# Patient Record
Sex: MALE | Race: OTHER | Employment: FULL TIME | URBAN - METROPOLITAN AREA
[De-identification: names, ages, dates, MRNs, and addresses within clinical notes are randomized per-mention and may not be internally consistent; named-entity substitution may affect disease eponyms.]

---

## 2019-03-11 ENCOUNTER — APPOINTMENT (OUTPATIENT)
Dept: CT IMAGING | Facility: HOSPITAL | Age: 49
End: 2019-03-11
Attending: EMERGENCY MEDICINE
Payer: COMMERCIAL

## 2019-03-11 ENCOUNTER — HOSPITAL ENCOUNTER (EMERGENCY)
Facility: HOSPITAL | Age: 49
Discharge: HOME | End: 2019-03-11
Attending: EMERGENCY MEDICINE
Payer: COMMERCIAL

## 2019-03-11 VITALS
RESPIRATION RATE: 18 BRPM | DIASTOLIC BLOOD PRESSURE: 91 MMHG | BODY MASS INDEX: 26.84 KG/M2 | OXYGEN SATURATION: 95 % | HEIGHT: 67 IN | WEIGHT: 171 LBS | HEART RATE: 84 BPM | TEMPERATURE: 99 F | SYSTOLIC BLOOD PRESSURE: 126 MMHG

## 2019-03-11 DIAGNOSIS — N40.0 ENLARGED PROSTATE: ICD-10-CM

## 2019-03-11 DIAGNOSIS — R11.2 NAUSEA AND VOMITING IN ADULT: ICD-10-CM

## 2019-03-11 DIAGNOSIS — R10.9 ABDOMINAL PAIN, ACUTE: Primary | ICD-10-CM

## 2019-03-11 LAB
ANION GAP SERPL CALC-SCNC: 8 MMOL/L (ref 0–18)
BASOPHILS # BLD AUTO: 0.02 X10(3) UL (ref 0–0.2)
BASOPHILS NFR BLD AUTO: 0.3 %
BILIRUB UR QL: NEGATIVE
BUN BLD-MCNC: 13 MG/DL (ref 7–18)
BUN/CREAT SERPL: 13.1 (ref 10–20)
CALCIUM BLD-MCNC: 8.5 MG/DL (ref 8.5–10.1)
CHLORIDE SERPL-SCNC: 106 MMOL/L (ref 98–107)
CLARITY UR: CLEAR
CO2 SERPL-SCNC: 24 MMOL/L (ref 21–32)
COLOR UR: YELLOW
CREAT BLD-MCNC: 0.99 MG/DL (ref 0.7–1.3)
DEPRECATED RDW RBC AUTO: 41.1 FL (ref 35.1–46.3)
EOSINOPHIL # BLD AUTO: 0.03 X10(3) UL (ref 0–0.7)
EOSINOPHIL NFR BLD AUTO: 0.5 %
ERYTHROCYTE [DISTWIDTH] IN BLOOD BY AUTOMATED COUNT: 13.5 % (ref 11–15)
GLUCOSE BLD-MCNC: 109 MG/DL (ref 70–99)
GLUCOSE UR-MCNC: NEGATIVE MG/DL
HAV IGM SER QL: 2.5 MG/DL (ref 1.6–2.6)
HCT VFR BLD AUTO: 53.3 % (ref 39–53)
HGB BLD-MCNC: 18.7 G/DL (ref 13–17.5)
IMM GRANULOCYTES # BLD AUTO: 0.03 X10(3) UL (ref 0–1)
IMM GRANULOCYTES NFR BLD: 0.5 %
KETONES UR-MCNC: NEGATIVE MG/DL
LEUKOCYTE ESTERASE UR QL STRIP.AUTO: NEGATIVE
LYMPHOCYTES # BLD AUTO: 0.68 X10(3) UL (ref 1–4)
LYMPHOCYTES NFR BLD AUTO: 11.7 %
MCH RBC QN AUTO: 29.6 PG (ref 26–34)
MCHC RBC AUTO-ENTMCNC: 35.1 G/DL (ref 31–37)
MCV RBC AUTO: 84.3 FL (ref 80–100)
MONOCYTES # BLD AUTO: 0.49 X10(3) UL (ref 0.1–1)
MONOCYTES NFR BLD AUTO: 8.4 %
NEUTROPHILS # BLD AUTO: 4.57 X10 (3) UL (ref 1.5–7.7)
NEUTROPHILS # BLD AUTO: 4.57 X10(3) UL (ref 1.5–7.7)
NEUTROPHILS NFR BLD AUTO: 78.6 %
NITRITE UR QL STRIP.AUTO: NEGATIVE
OSMOLALITY SERPL CALC.SUM OF ELEC: 287 MOSM/KG (ref 275–295)
PH UR: 5 [PH] (ref 5–8)
PLATELET # BLD AUTO: 141 10(3)UL (ref 150–450)
POTASSIUM SERPL-SCNC: 3.7 MMOL/L (ref 3.5–5.1)
PROT UR-MCNC: NEGATIVE MG/DL
RBC # BLD AUTO: 6.32 X10(6)UL (ref 4.3–5.7)
RBC #/AREA URNS AUTO: 3 /HPF
SODIUM SERPL-SCNC: 138 MMOL/L (ref 136–145)
SP GR UR STRIP: 1.02 (ref 1–1.03)
UROBILINOGEN UR STRIP-ACNC: <2
VIT C UR-MCNC: NEGATIVE MG/DL
WBC # BLD AUTO: 5.8 X10(3) UL (ref 4–11)
WBC #/AREA URNS AUTO: <1 /HPF

## 2019-03-11 PROCEDURE — 81001 URINALYSIS AUTO W/SCOPE: CPT | Performed by: EMERGENCY MEDICINE

## 2019-03-11 PROCEDURE — 83735 ASSAY OF MAGNESIUM: CPT | Performed by: EMERGENCY MEDICINE

## 2019-03-11 PROCEDURE — 99285 EMERGENCY DEPT VISIT HI MDM: CPT

## 2019-03-11 PROCEDURE — 85060 BLOOD SMEAR INTERPRETATION: CPT | Performed by: EMERGENCY MEDICINE

## 2019-03-11 PROCEDURE — 96374 THER/PROPH/DIAG INJ IV PUSH: CPT

## 2019-03-11 PROCEDURE — 96361 HYDRATE IV INFUSION ADD-ON: CPT

## 2019-03-11 PROCEDURE — 80048 BASIC METABOLIC PNL TOTAL CA: CPT

## 2019-03-11 PROCEDURE — 85060 BLOOD SMEAR INTERPRETATION: CPT

## 2019-03-11 PROCEDURE — 80048 BASIC METABOLIC PNL TOTAL CA: CPT | Performed by: EMERGENCY MEDICINE

## 2019-03-11 PROCEDURE — 74177 CT ABD & PELVIS W/CONTRAST: CPT | Performed by: EMERGENCY MEDICINE

## 2019-03-11 PROCEDURE — 85025 COMPLETE CBC W/AUTO DIFF WBC: CPT

## 2019-03-11 PROCEDURE — 85025 COMPLETE CBC W/AUTO DIFF WBC: CPT | Performed by: EMERGENCY MEDICINE

## 2019-03-11 RX ORDER — ONDANSETRON 4 MG/1
4 TABLET, ORALLY DISINTEGRATING ORAL EVERY 4 HOURS PRN
Qty: 10 TABLET | Refills: 0 | Status: SHIPPED | OUTPATIENT
Start: 2019-03-11 | End: 2019-03-18

## 2019-03-11 RX ORDER — MORPHINE SULFATE 4 MG/ML
4 INJECTION, SOLUTION INTRAMUSCULAR; INTRAVENOUS ONCE
Status: COMPLETED | OUTPATIENT
Start: 2019-03-11 | End: 2019-03-11

## 2019-03-11 RX ORDER — DICYCLOMINE HCL 20 MG
20 TABLET ORAL 4 TIMES DAILY PRN
Qty: 30 TABLET | Refills: 0 | Status: SHIPPED | OUTPATIENT
Start: 2019-03-11 | End: 2019-04-10

## 2019-03-11 NOTE — ED PROVIDER NOTES
Patient Seen in: [facility] AND [facility] Emergency Department    History   Patient presents with:  Abdomen/Flank Pain (GI/)    Stated Complaint: rule out appendicitis    HPI    43-year-old male presents for complaint of right lower quadrant abdominal pain. 26.78 kg/m²         Physical Exam   Constitutional: He is oriented to person, place, and time. He appears well-developed and well-nourished. HENT:   Head: Normocephalic and atraumatic. Neck: Normal range of motion. Neck supple.    Cardiovascular: Normal unremarkable urinalysis negative for any acute findings. CT scan negative for acute findings. He is informed of his prostate enlargement. Patient likely with viral syndrome. Hes feeling better at discharge.      Imaging:   Ct Abdomen+pelvis(contrast Only visible pulmonary or pleural disease. OTHER: Negative. CONCLUSION:     1. Uncomplicated sigmoid diverticulosis. 2. Fatty liver. 3. 1.10 cm splenule. 4. Small umbilical hernia. 5. Mild reflex ileus. 6. Evidence of herniorrhaphy lower abdomen.  7. Normal ap

## 2019-09-23 NOTE — H&P
[address] [facility] [address] Gastroenterology                                                                                                  Clinic History and Physical     Pa Medications, Allergies, ROS:      Past Medical History:   Diagnosis Date   • Diverticulitis       No past surgical history on file. Family Hx: No family history on file.    Social History: Social History    Tobacco Use      Smoking status: Never Smoker edema is evident.    Neuro: Alert and oriented x4, and patient is having movements of all 4 extremities   Psych: Pt has a normal mood and affect, behavior is normal    Nursing note and vitals reviewed      Labs/Imaging:     Patient's labs and imaging were r procedure(s) - N/A   - NO alcohol, recreational drugs nor erectile dysfunction mediations 24 hours before procedure(s)   - NO herbal supplements or weight loss medications x 7 days prior to the procedure(s)    ** If MAC @ [facility] or IV twilight - continue all

## 2019-09-30 ENCOUNTER — TELEPHONE (OUTPATIENT)
Dept: GASTROENTEROLOGY | Facility: CLINIC | Age: 49
End: 2019-09-30

## 2019-09-30 ENCOUNTER — OFFICE VISIT (OUTPATIENT)
Dept: GASTROENTEROLOGY | Facility: CLINIC | Age: 49
End: 2019-09-30
Payer: COMMERCIAL

## 2019-09-30 VITALS
BODY MASS INDEX: 29.53 KG/M2 | WEIGHT: 173 LBS | HEART RATE: 69 BPM | SYSTOLIC BLOOD PRESSURE: 136 MMHG | HEIGHT: 64 IN | DIASTOLIC BLOOD PRESSURE: 96 MMHG

## 2019-09-30 DIAGNOSIS — Z87.19 HISTORY OF COLONIC DIVERTICULITIS: Primary | ICD-10-CM

## 2019-09-30 DIAGNOSIS — Z12.11 COLON CANCER SCREENING: Primary | ICD-10-CM

## 2019-09-30 DIAGNOSIS — Z87.19 HISTORY OF COLONIC DIVERTICULITIS: ICD-10-CM

## 2019-09-30 DIAGNOSIS — R10.32 LLQ PAIN: ICD-10-CM

## 2019-09-30 PROCEDURE — 99243 OFF/OP CNSLTJ NEW/EST LOW 30: CPT | Performed by: NURSE PRACTITIONER

## 2019-09-30 RX ORDER — NAPROXEN 500 MG/1
TABLET ORAL
Refills: 0 | COMMUNITY
Start: 2019-07-22 | End: 2020-11-12

## 2019-09-30 NOTE — PATIENT INSTRUCTIONS
Recommend:  -Schedule colonoscopy w/Dr. Rosa Nunez, Dr. Alireza Arias, Dr. Ramon Vázquez w/ IV Twilight or MAC  Dx: hx diverticulitis   -Eligible for NE: Yes  -Prep: Split dose Colyte or equivalent  -Anti-platelets and anti-coagulants: None  -Diabetes meds: None    ** If

## 2019-09-30 NOTE — TELEPHONE ENCOUNTER
Scheduled for:  Colonoscopy 30686  Provider Name:   Date:  10/7/19  Location:  [facility]  Sedation:  Mac  Time:  0900 ----------- Dagmar Tian 0800  Prep: Colyte  Meds/Allergies Reconciled?:  Physician reviewed  Diagnosis with codes:  Colon cancer screening Z12. 1

## 2019-10-07 ENCOUNTER — ANESTHESIA EVENT (OUTPATIENT)
Dept: ENDOSCOPY | Facility: HOSPITAL | Age: 49
End: 2019-10-07
Payer: COMMERCIAL

## 2019-10-07 ENCOUNTER — ANESTHESIA (OUTPATIENT)
Dept: ENDOSCOPY | Facility: HOSPITAL | Age: 49
End: 2019-10-07
Payer: COMMERCIAL

## 2019-10-07 ENCOUNTER — HOSPITAL ENCOUNTER (OUTPATIENT)
Facility: HOSPITAL | Age: 49
Setting detail: HOSPITAL OUTPATIENT SURGERY
Discharge: HOME | End: 2019-10-07
Attending: INTERNAL MEDICINE | Admitting: INTERNAL MEDICINE
Payer: COMMERCIAL

## 2019-10-07 VITALS
DIASTOLIC BLOOD PRESSURE: 90 MMHG | HEIGHT: 64 IN | OXYGEN SATURATION: 95 % | SYSTOLIC BLOOD PRESSURE: 128 MMHG | HEART RATE: 73 BPM | RESPIRATION RATE: 15 BRPM | WEIGHT: 170 LBS | BODY MASS INDEX: 29.02 KG/M2

## 2019-10-07 DIAGNOSIS — Z87.19 HISTORY OF COLONIC DIVERTICULITIS: ICD-10-CM

## 2019-10-07 DIAGNOSIS — Z12.11 COLON CANCER SCREENING: ICD-10-CM

## 2019-10-07 PROCEDURE — 0DJD8ZZ INSPECTION OF LOWER INTESTINAL TRACT, VIA NATURAL OR ARTIFICIAL OPENING ENDOSCOPIC: ICD-10-PCS | Performed by: INTERNAL MEDICINE

## 2019-10-07 PROCEDURE — 45378 DIAGNOSTIC COLONOSCOPY: CPT | Performed by: INTERNAL MEDICINE

## 2019-10-07 RX ORDER — NALOXONE HYDROCHLORIDE 0.4 MG/ML
80 INJECTION, SOLUTION INTRAMUSCULAR; INTRAVENOUS; SUBCUTANEOUS AS NEEDED
Status: DISCONTINUED | OUTPATIENT
Start: 2019-10-07 | End: 2019-10-07

## 2019-10-07 RX ORDER — SODIUM CHLORIDE, SODIUM LACTATE, POTASSIUM CHLORIDE, CALCIUM CHLORIDE 600; 310; 30; 20 MG/100ML; MG/100ML; MG/100ML; MG/100ML
INJECTION, SOLUTION INTRAVENOUS CONTINUOUS
Status: DISCONTINUED | OUTPATIENT
Start: 2019-10-07 | End: 2019-10-07

## 2019-10-07 RX ORDER — LIDOCAINE HYDROCHLORIDE 10 MG/ML
INJECTION, SOLUTION EPIDURAL; INFILTRATION; INTRACAUDAL; PERINEURAL AS NEEDED
Status: DISCONTINUED | OUTPATIENT
Start: 2019-10-07 | End: 2019-10-07 | Stop reason: SURG

## 2019-10-07 RX ADMIN — SODIUM CHLORIDE, SODIUM LACTATE, POTASSIUM CHLORIDE, CALCIUM CHLORIDE: 600; 310; 30; 20 INJECTION, SOLUTION INTRAVENOUS at 09:52:00

## 2019-10-07 RX ADMIN — LIDOCAINE HYDROCHLORIDE 50 MG: 10 INJECTION, SOLUTION EPIDURAL; INFILTRATION; INTRACAUDAL; PERINEURAL at 09:52:00

## 2019-10-07 RX ADMIN — SODIUM CHLORIDE, SODIUM LACTATE, POTASSIUM CHLORIDE, CALCIUM CHLORIDE: 600; 310; 30; 20 INJECTION, SOLUTION INTRAVENOUS at 10:27:00

## 2019-10-07 NOTE — ANESTHESIA PREPROCEDURE EVALUATION
Anesthesia PreOp Note    HPI:     Sherrell Basilio is a 54year old male who presents for preoperative consultation requested by: Mary Jo Liu MD    Date of Surgery: 10/7/2019    Procedure(s):  COLONOSCOPY  Indication: Colon cancer screening, Histo file        Minutes per session: Not on file      Stress: Not on file    Relationships      Social connections:        Talks on phone: Not on file        Gets together: Not on file        Attends [employer] service: Not on file        Active member of club The patient desires the anesthetic management as planned.   Andrew Mast  10/7/2019 9:47 AM

## 2019-10-07 NOTE — H&P
History & Physical Examination    Patient Name: Austin Mckeon  MRN: [id number]  [facility]: [id number]  YOB: 1970    Diagnosis: Colorectal cancer screening, personal history of diverticulitis        Medications Prior to Admission:  naproxen 500 MG

## 2019-10-07 NOTE — ANESTHESIA POSTPROCEDURE EVALUATION
Patient: Rex Early    Procedure Summary     Date:  10/07/19 Room / Location:  [facility] ENDOSCOPY 04 / [facility] ENDOSCOPY    Anesthesia Start:  6134 Anesthesia Stop:      Procedure:  COLONOSCOPY (N/A ) Diagnosis:       Colon cancer screening      History of col

## 2019-10-07 NOTE — OPERATIVE REPORT
[facility] Endoscopy Report      Date of Procedure:  10/07/19      Preoperative Diagnosis:  1. Colorectal cancer screening  2.   Personal history of diverticulitis      Postoperative Diagnosis:  Sigmoid colon diverticulosis      Procedure: colonoscopy in 10 years unless any new symptoms or signs are noted. 3.  Surgical evaluation if the patient continues to have recurrent episodes of diverticulitis or has a complicated episode in the future.         Matilde Mcneil MD  10/7/2019

## 2019-10-20 ENCOUNTER — TELEPHONE (OUTPATIENT)
Dept: GASTROENTEROLOGY | Facility: CLINIC | Age: 49
End: 2019-10-20

## 2019-10-21 NOTE — TELEPHONE ENCOUNTER
Recall colon in 10 years per Dr. Gudelia Barrett & updated in health maintenance. Colon done 10/7/19.

## 2019-11-18 ENCOUNTER — APPOINTMENT (OUTPATIENT)
Dept: GENERAL RADIOLOGY | Facility: HOSPITAL | Age: 49
End: 2019-11-18
Attending: EMERGENCY MEDICINE
Payer: COMMERCIAL

## 2019-11-18 ENCOUNTER — HOSPITAL ENCOUNTER (EMERGENCY)
Facility: HOSPITAL | Age: 49
Discharge: HOME HEALTH | End: 2019-11-18
Attending: EMERGENCY MEDICINE
Payer: COMMERCIAL

## 2019-11-18 VITALS
WEIGHT: 170 LBS | OXYGEN SATURATION: 93 % | RESPIRATION RATE: 14 BRPM | TEMPERATURE: 99 F | DIASTOLIC BLOOD PRESSURE: 86 MMHG | BODY MASS INDEX: 29.02 KG/M2 | HEART RATE: 88 BPM | SYSTOLIC BLOOD PRESSURE: 137 MMHG | HEIGHT: 64 IN

## 2019-11-18 DIAGNOSIS — M62.830 LUMBAR PARASPINAL MUSCLE SPASM: Primary | ICD-10-CM

## 2019-11-18 PROCEDURE — 99284 EMERGENCY DEPT VISIT MOD MDM: CPT

## 2019-11-18 PROCEDURE — 96374 THER/PROPH/DIAG INJ IV PUSH: CPT

## 2019-11-18 PROCEDURE — 72100 X-RAY EXAM L-S SPINE 2/3 VWS: CPT | Performed by: EMERGENCY MEDICINE

## 2019-11-18 PROCEDURE — 96375 TX/PRO/DX INJ NEW DRUG ADDON: CPT

## 2019-11-18 PROCEDURE — 96376 TX/PRO/DX INJ SAME DRUG ADON: CPT

## 2019-11-18 RX ORDER — HYDROCODONE BITARTRATE AND ACETAMINOPHEN 5; 325 MG/1; MG/1
1 TABLET ORAL EVERY 6 HOURS PRN
COMMUNITY
End: 2020-11-12

## 2019-11-18 RX ORDER — LORAZEPAM 2 MG/ML
0.5 INJECTION INTRAMUSCULAR ONCE
Status: COMPLETED | OUTPATIENT
Start: 2019-11-18 | End: 2019-11-18

## 2019-11-18 RX ORDER — ONDANSETRON 2 MG/ML
4 INJECTION INTRAMUSCULAR; INTRAVENOUS ONCE
Status: COMPLETED | OUTPATIENT
Start: 2019-11-18 | End: 2019-11-18

## 2019-11-18 RX ORDER — CYCLOBENZAPRINE HCL 10 MG
10 TABLET ORAL 3 TIMES DAILY PRN
COMMUNITY
End: 2020-11-12

## 2019-11-18 RX ORDER — MORPHINE SULFATE 4 MG/ML
4 INJECTION, SOLUTION INTRAMUSCULAR; INTRAVENOUS ONCE
Status: COMPLETED | OUTPATIENT
Start: 2019-11-18 | End: 2019-11-18

## 2019-11-18 RX ORDER — KETOROLAC TROMETHAMINE 30 MG/ML
30 INJECTION, SOLUTION INTRAMUSCULAR; INTRAVENOUS ONCE
Status: COMPLETED | OUTPATIENT
Start: 2019-11-18 | End: 2019-11-18

## 2019-11-18 RX ORDER — DIAZEPAM 5 MG/1
5 TABLET ORAL EVERY 12 HOURS PRN
Qty: 16 TABLET | Refills: 0 | Status: SHIPPED | OUTPATIENT
Start: 2019-11-18 | End: 2019-11-25

## 2019-11-18 NOTE — ED NOTES
Pt c/o atraumatic bl low back pain for several days, worse today. Pt notes that he has some tingling to ble as well. Pt denies hx of back pain/injury. Denies loss of bowel/bladder control, or the pain radiating to ble. CMS is intact to ble.  Pt was seen at

## 2019-11-18 NOTE — ED PROVIDER NOTES
Patient Seen in: [facility] AND [facility] Emergency Department    History   Patient presents with:  Back Pain (musculoskeletal)    Stated Complaint: Back Pain     HPI    Patient is here with complaint of ongoing back pain.   It started on Thursday its in the lef Pain   Other systems are as noted in HPI. Constitutional and vital signs reviewed. All other systems reviewed and negative except as noted above. Physical Exam     ED Triage Vitals [11/18/19 0200]   BP (!) 137/91   Pulse 92   Resp 20   Temp 99. 3 comfortable more relaxed. Still having some pain we will give him another round of pain medication. I talked to him and his family in detail about pain relief discussing NSAIDs discussing not to mix them and the role.   I discussed muscle relaxers I discu

## 2019-11-18 NOTE — ED INITIAL ASSESSMENT (HPI)
Atraumatic lower right and left sided back pain worsening since Thursday. Went to [facility] on Friday and rx with norco, cyclobenzaprine and naproxen with no relief.

## 2020-11-11 ENCOUNTER — TELEPHONE (OUTPATIENT)
Dept: FAMILY MEDICINE CLINIC | Facility: CLINIC | Age: 50
End: 2020-11-11

## 2020-11-11 NOTE — TELEPHONE ENCOUNTER
GHISLAINE Lackey Pt stated that he has been having right side chest pain by the nipple area like by third rib. This started about 5 days ago today the pain is a 4/10 but the pain turns into a 6/10 when he stands up or he bends. Pt feels a little sob.  Pt stated

## 2020-11-12 ENCOUNTER — OFFICE VISIT (OUTPATIENT)
Dept: FAMILY MEDICINE CLINIC | Facility: CLINIC | Age: 50
End: 2020-11-12

## 2020-11-12 VITALS
DIASTOLIC BLOOD PRESSURE: 67 MMHG | HEART RATE: 57 BPM | SYSTOLIC BLOOD PRESSURE: 146 MMHG | WEIGHT: 175 LBS | BODY MASS INDEX: 29.88 KG/M2 | HEIGHT: 64 IN

## 2020-11-12 DIAGNOSIS — R22.2 CHEST WALL MASS: Primary | ICD-10-CM

## 2020-11-12 DIAGNOSIS — G89.29 CHRONIC MIDLINE BACK PAIN, UNSPECIFIED BACK LOCATION: ICD-10-CM

## 2020-11-12 DIAGNOSIS — M54.9 CHRONIC MIDLINE BACK PAIN, UNSPECIFIED BACK LOCATION: ICD-10-CM

## 2020-11-12 DIAGNOSIS — I10 ESSENTIAL HYPERTENSION: ICD-10-CM

## 2020-11-12 DIAGNOSIS — R20.2 PARESTHESIA: ICD-10-CM

## 2020-11-12 PROCEDURE — 3078F DIAST BP <80 MM HG: CPT | Performed by: FAMILY MEDICINE

## 2020-11-12 PROCEDURE — 3077F SYST BP >= 140 MM HG: CPT | Performed by: FAMILY MEDICINE

## 2020-11-12 PROCEDURE — 3008F BODY MASS INDEX DOCD: CPT | Performed by: FAMILY MEDICINE

## 2020-11-12 PROCEDURE — 99203 OFFICE O/P NEW LOW 30 MIN: CPT | Performed by: FAMILY MEDICINE

## 2020-11-12 RX ORDER — METHOCARBAMOL 500 MG/1
500 TABLET, FILM COATED ORAL EVERY EVENING
Qty: 10 TABLET | Refills: 0 | Status: SHIPPED | OUTPATIENT
Start: 2020-11-12 | End: 2020-11-23

## 2020-11-12 RX ORDER — LISINOPRIL 10 MG/1
10 TABLET ORAL DAILY
Qty: 30 TABLET | Refills: 0 | Status: SHIPPED | OUTPATIENT
Start: 2020-11-12 | End: 2020-12-12

## 2020-11-12 RX ORDER — DULOXETIN HYDROCHLORIDE 30 MG/1
30 CAPSULE, DELAYED RELEASE ORAL DAILY
Qty: 30 CAPSULE | Refills: 0 | Status: SHIPPED | OUTPATIENT
Start: 2020-11-12 | End: 2020-11-23

## 2020-11-12 NOTE — PROGRESS NOTES
Sheng Garner is a 52year old male.   HPI:   Patient reports one year history of severe back pain after a twisting movement, this pain lasted about 3 weeks, he was at that time was able to walk upstairs independently and was very limited in move Cardiovascular: Negative. Gastrointestinal: Negative. Musculoskeletal: Positive for arthralgias, back pain and joint swelling. Skin: Negative. Neurological: Negative.            EXAM:   /67 (BP Location: Right arm, Patient Position: Sitting - XR THORACIC SPINE (3 VIEWS) (CPT=72072); Future  - XR LUMBAR SPINE (MIN 4 VIEWS) (CPT=72110); Future  - DULoxetine HCl (CYMBALTA) 30 MG Oral Cap DR Particles; Take 1 capsule (30 mg total) by mouth daily. Dispense: 30 capsule;  Refill: 0  - methocarbamol

## 2020-11-14 ENCOUNTER — LAB ENCOUNTER (OUTPATIENT)
Dept: LAB | Age: 50
End: 2020-11-14
Attending: FAMILY MEDICINE
Payer: COMMERCIAL

## 2020-11-14 DIAGNOSIS — I10 ESSENTIAL HYPERTENSION: ICD-10-CM

## 2020-11-14 DIAGNOSIS — R20.2 PARESTHESIA: ICD-10-CM

## 2020-11-14 PROCEDURE — 84443 ASSAY THYROID STIM HORMONE: CPT

## 2020-11-14 PROCEDURE — 36415 COLL VENOUS BLD VENIPUNCTURE: CPT

## 2020-11-14 PROCEDURE — 80061 LIPID PANEL: CPT

## 2020-11-14 PROCEDURE — 82607 VITAMIN B-12: CPT

## 2020-11-14 PROCEDURE — 80053 COMPREHEN METABOLIC PANEL: CPT

## 2020-11-16 ENCOUNTER — HOSPITAL ENCOUNTER (OUTPATIENT)
Dept: GENERAL RADIOLOGY | Facility: HOSPITAL | Age: 50
Discharge: HOME | End: 2020-11-16
Attending: FAMILY MEDICINE
Payer: COMMERCIAL

## 2020-11-16 DIAGNOSIS — G89.29 CHRONIC MIDLINE BACK PAIN, UNSPECIFIED BACK LOCATION: ICD-10-CM

## 2020-11-16 DIAGNOSIS — M54.9 CHRONIC MIDLINE BACK PAIN, UNSPECIFIED BACK LOCATION: ICD-10-CM

## 2020-11-16 DIAGNOSIS — R22.2 CHEST WALL MASS: ICD-10-CM

## 2020-11-16 PROCEDURE — 72072 X-RAY EXAM THORAC SPINE 3VWS: CPT | Performed by: FAMILY MEDICINE

## 2020-11-16 PROCEDURE — 71111 X-RAY EXAM RIBS/CHEST4/> VWS: CPT | Performed by: FAMILY MEDICINE

## 2020-11-16 PROCEDURE — 72110 X-RAY EXAM L-2 SPINE 4/>VWS: CPT | Performed by: FAMILY MEDICINE

## 2020-11-17 DIAGNOSIS — M47.9 SPONDYLOSIS: Primary | ICD-10-CM

## 2020-11-23 ENCOUNTER — OFFICE VISIT (OUTPATIENT)
Dept: FAMILY MEDICINE CLINIC | Facility: CLINIC | Age: 50
End: 2020-11-23

## 2020-11-23 VITALS
WEIGHT: 173 LBS | DIASTOLIC BLOOD PRESSURE: 73 MMHG | HEART RATE: 66 BPM | HEIGHT: 64 IN | SYSTOLIC BLOOD PRESSURE: 121 MMHG | BODY MASS INDEX: 29.53 KG/M2

## 2020-11-23 DIAGNOSIS — M47.816 OSTEOARTHRITIS OF LUMBAR SPINE, UNSPECIFIED SPINAL OSTEOARTHRITIS COMPLICATION STATUS: ICD-10-CM

## 2020-11-23 DIAGNOSIS — M47.815 OSTEOARTHRITIS OF THORACOLUMBAR SPINE, UNSPECIFIED SPINAL OSTEOARTHRITIS COMPLICATION STATUS: Primary | ICD-10-CM

## 2020-11-23 DIAGNOSIS — N52.9 ERECTILE DYSFUNCTION, UNSPECIFIED ERECTILE DYSFUNCTION TYPE: ICD-10-CM

## 2020-11-23 PROCEDURE — 99213 OFFICE O/P EST LOW 20 MIN: CPT | Performed by: FAMILY MEDICINE

## 2020-11-23 PROCEDURE — 3008F BODY MASS INDEX DOCD: CPT | Performed by: FAMILY MEDICINE

## 2020-11-23 PROCEDURE — 3078F DIAST BP <80 MM HG: CPT | Performed by: FAMILY MEDICINE

## 2020-11-23 PROCEDURE — 3074F SYST BP LT 130 MM HG: CPT | Performed by: FAMILY MEDICINE

## 2020-11-23 RX ORDER — SILDENAFIL 50 MG/1
50 TABLET, FILM COATED ORAL
Qty: 30 TABLET | Refills: 0 | Status: SHIPPED | OUTPATIENT
Start: 2020-11-23 | End: 2021-12-06

## 2020-11-23 RX ORDER — GABAPENTIN 100 MG/1
100 CAPSULE ORAL 3 TIMES DAILY
Qty: 90 CAPSULE | Refills: 0 | Status: SHIPPED | OUTPATIENT
Start: 2020-11-23 | End: 2020-12-23

## 2020-11-23 NOTE — PROGRESS NOTES
Paloma Eaton is a 52year old male. HPI:   Patient is here to follow-up in lab work and x-rays, he has not seen physiatry yet, he like to discuss the x-rays findings.   He states that with medications he felt that his mild decrease in pain but He is not ill-appearing. HENT:      Head: Normocephalic and atraumatic. Neck:      Musculoskeletal: Normal range of motion. Cardiovascular:      Rate and Rhythm: Normal rate and regular rhythm.    Pulmonary:      Effort: Pulmonary effort is normal. plan.

## 2020-11-24 ENCOUNTER — TELEPHONE (OUTPATIENT)
Dept: FAMILY MEDICINE CLINIC | Facility: CLINIC | Age: 50
End: 2020-11-24

## 2020-11-24 NOTE — TELEPHONE ENCOUNTER
Patient's states his insurance is not covering sildenafil. Please advise.      Sildenafil Citrate 50 MG Oral Tab 30 tablet

## 2020-11-24 NOTE — TELEPHONE ENCOUNTER
Followed up with patient, he agreed to contact insurance to verify if this class of medication is covered, if so advised to get name of medication so we can send another script.

## 2020-12-10 ENCOUNTER — OFFICE VISIT (OUTPATIENT)
Dept: NEUROLOGY | Facility: CLINIC | Age: 50
End: 2020-12-10

## 2020-12-10 VITALS — HEIGHT: 64 IN | BODY MASS INDEX: 29.37 KG/M2 | WEIGHT: 172 LBS

## 2020-12-10 DIAGNOSIS — G89.29 CHRONIC BILATERAL LOW BACK PAIN WITHOUT SCIATICA: ICD-10-CM

## 2020-12-10 DIAGNOSIS — R07.89 CHEST WALL PAIN: ICD-10-CM

## 2020-12-10 DIAGNOSIS — G89.29 CHRONIC LEFT SHOULDER PAIN: ICD-10-CM

## 2020-12-10 DIAGNOSIS — M25.512 CHRONIC LEFT SHOULDER PAIN: ICD-10-CM

## 2020-12-10 DIAGNOSIS — M54.50 CHRONIC BILATERAL LOW BACK PAIN WITHOUT SCIATICA: ICD-10-CM

## 2020-12-10 DIAGNOSIS — M47.816 LUMBAR SPONDYLOSIS: Primary | ICD-10-CM

## 2020-12-10 PROCEDURE — 99244 OFF/OP CNSLTJ NEW/EST MOD 40: CPT | Performed by: PHYSICAL MEDICINE & REHABILITATION

## 2020-12-10 PROCEDURE — 3008F BODY MASS INDEX DOCD: CPT | Performed by: PHYSICAL MEDICINE & REHABILITATION

## 2020-12-10 RX ORDER — MELOXICAM 15 MG/1
TABLET ORAL
Qty: 1 TABLET | Refills: 0 | Status: SHIPPED | OUTPATIENT
Start: 2020-12-10 | End: 2020-12-17

## 2020-12-10 NOTE — H&P
Mitchell Dub 39    History and Physical    [facility] Patient Status:  No patient class for patient encounter    1970 MRN [id number]   [facility]  Attending No att. providers found   [facility] problems that he can recall. Patient also states that he has a bony deformity of the right chest wall that is not painful that he noticed several weeks ago.      History     Past Medical History:   Diagnosis Date   • Diverticulitis      Past Surgical His well-developed and well-nourished. HENT:   Head: Normocephalic and atraumatic. Eyes: Conjunctivae and EOM are normal.   Cardiovascular: Intact distal pulses. Pulmonary/Chest: Effort normal.   Abdominal: Soft.  Musculoskeletal:      Comments: Lumbar r narrowing most significant at L5-S1.  OTHER:             Radiopaque coils in the lower pelvis may relate to ventral hernia repair.     =====  CONCLUSION: Mild multilevel lumbar spondylosis slightly progressed since the prior study.     XR ribs 11/16/2020:

## 2020-12-10 NOTE — PATIENT INSTRUCTIONS
If Doctor has ordered an injection or MRI and if you do not hear from us within 3 business days please call the office or send a message through [address] and ask if the injection/MRI has been approved

## 2020-12-15 ENCOUNTER — HOSPITAL ENCOUNTER (OUTPATIENT)
Dept: GENERAL RADIOLOGY | Facility: HOSPITAL | Age: 50
Discharge: HOME | End: 2020-12-15
Attending: PHYSICAL MEDICINE & REHABILITATION
Payer: COMMERCIAL

## 2020-12-15 DIAGNOSIS — M25.512 CHRONIC LEFT SHOULDER PAIN: ICD-10-CM

## 2020-12-15 DIAGNOSIS — G89.29 CHRONIC LEFT SHOULDER PAIN: ICD-10-CM

## 2020-12-15 DIAGNOSIS — R07.89 CHEST WALL PAIN: ICD-10-CM

## 2020-12-15 PROCEDURE — 73030 X-RAY EXAM OF SHOULDER: CPT | Performed by: PHYSICAL MEDICINE & REHABILITATION

## 2020-12-15 PROCEDURE — 71045 X-RAY EXAM CHEST 1 VIEW: CPT | Performed by: PHYSICAL MEDICINE & REHABILITATION

## 2020-12-17 ENCOUNTER — TELEPHONE (OUTPATIENT)
Dept: NEUROLOGY | Facility: CLINIC | Age: 50
End: 2020-12-17

## 2020-12-17 DIAGNOSIS — M47.816 LUMBAR SPONDYLOSIS: ICD-10-CM

## 2020-12-17 DIAGNOSIS — M54.50 CHRONIC BILATERAL LOW BACK PAIN WITHOUT SCIATICA: ICD-10-CM

## 2020-12-17 DIAGNOSIS — G89.29 CHRONIC BILATERAL LOW BACK PAIN WITHOUT SCIATICA: ICD-10-CM

## 2020-12-17 RX ORDER — MELOXICAM 15 MG/1
TABLET ORAL
Qty: 30 TABLET | Refills: 0 | Status: SHIPPED | OUTPATIENT
Start: 2020-12-17 | End: 2021-12-06

## 2020-12-17 NOTE — TELEPHONE ENCOUNTER
----- Message from Carisa Dose, DO sent at 12/17/2020  8:56 AM CST -----  Please let the patient know that the Xray of the chest and left shoulder are essentially normal in terms of the bones and joints.  Proceed with PT.

## 2020-12-17 NOTE — TELEPHONE ENCOUNTER
Informed patient of imaging results and recommendations per Dr. Sharon Fuller. Patient verbalized understanding. Patient states meloxicam was only for 1 tablet. Informed patient Dr. Sharon Fuller will send new RX to pharm.

## 2020-12-18 ENCOUNTER — MED REC SCAN ONLY (OUTPATIENT)
Dept: NEUROLOGY | Facility: CLINIC | Age: 50
End: 2020-12-18

## 2020-12-22 ENCOUNTER — TELEPHONE (OUTPATIENT)
Dept: NEUROLOGY | Facility: CLINIC | Age: 50
End: 2020-12-22

## 2021-03-02 ENCOUNTER — NURSE TRIAGE (OUTPATIENT)
Dept: FAMILY MEDICINE CLINIC | Facility: CLINIC | Age: 51
End: 2021-03-02

## 2021-03-02 NOTE — TELEPHONE ENCOUNTER
Action Requested: Summary for Provider     []  Critical Lab, Recommendations Needed  [] Need Additional Advice  []   FYI    []   Need Orders  [] Need Medications Sent to Pharmacy  []  Other     SUMMARY: appt made 3/3/21 OV with PCP.     Reason for call: Abd

## 2021-04-16 ENCOUNTER — IMMUNIZATION (OUTPATIENT)
Dept: LAB | Facility: HOSPITAL | Age: 51
End: 2021-04-16
Attending: EMERGENCY MEDICINE
Payer: COMMERCIAL

## 2021-04-16 DIAGNOSIS — Z23 NEED FOR VACCINATION: Primary | ICD-10-CM

## 2021-04-16 PROCEDURE — 0011A SARSCOV2 VAC 100MCG/0.5ML IM: CPT

## 2021-04-26 ENCOUNTER — OFFICE VISIT (OUTPATIENT)
Dept: FAMILY MEDICINE CLINIC | Facility: CLINIC | Age: 51
End: 2021-04-26
Payer: COMMERCIAL

## 2021-04-26 ENCOUNTER — NURSE TRIAGE (OUTPATIENT)
Dept: FAMILY MEDICINE CLINIC | Facility: CLINIC | Age: 51
End: 2021-04-26

## 2021-04-26 VITALS
BODY MASS INDEX: 30.39 KG/M2 | HEIGHT: 64 IN | SYSTOLIC BLOOD PRESSURE: 112 MMHG | DIASTOLIC BLOOD PRESSURE: 68 MMHG | WEIGHT: 178 LBS | HEART RATE: 63 BPM

## 2021-04-26 DIAGNOSIS — M47.815 OSTEOARTHRITIS OF THORACOLUMBAR SPINE, UNSPECIFIED SPINAL OSTEOARTHRITIS COMPLICATION STATUS: Primary | ICD-10-CM

## 2021-04-26 PROCEDURE — 3074F SYST BP LT 130 MM HG: CPT | Performed by: FAMILY MEDICINE

## 2021-04-26 PROCEDURE — 3008F BODY MASS INDEX DOCD: CPT | Performed by: FAMILY MEDICINE

## 2021-04-26 PROCEDURE — 99213 OFFICE O/P EST LOW 20 MIN: CPT | Performed by: FAMILY MEDICINE

## 2021-04-26 PROCEDURE — 3078F DIAST BP <80 MM HG: CPT | Performed by: FAMILY MEDICINE

## 2021-04-26 RX ORDER — CYCLOBENZAPRINE HCL 10 MG
10 TABLET ORAL NIGHTLY
Qty: 10 TABLET | Refills: 0 | Status: SHIPPED | OUTPATIENT
Start: 2021-04-26 | End: 2021-05-06

## 2021-04-26 RX ORDER — METHYLPREDNISOLONE 4 MG/1
TABLET ORAL
Qty: 1 KIT | Refills: 0 | Status: SHIPPED | OUTPATIENT
Start: 2021-04-26 | End: 2021-12-06

## 2021-04-26 NOTE — TELEPHONE ENCOUNTER
One year ago had hx back pain with diagnostics. No resolution. Last week Thursday started with bad back pain. Had a massage. Has no relief. Patient states in morning unable to walk due to pain. Advised appt today. Patient agreed.  His wife will

## 2021-04-26 NOTE — PROGRESS NOTES
Jeri Burger is a 52year old male.   HPI:   For that overall he was doing fine but then he developed severe pain last week in the middle of the back, after last adjustment a year ago he was asymptomatic, he did see rehab and was recommended to place, and time. Psychiatric:         Mood and Affect: Mood normal.         Behavior: Behavior normal.            ASSESSMENT AND PLAN:   1.  Osteoarthritis of thoracolumbar spine, unspecified spinal osteoarthritis complication status  Patient with recurre

## 2021-04-27 ENCOUNTER — TELEPHONE (OUTPATIENT)
Dept: FAMILY MEDICINE CLINIC | Facility: CLINIC | Age: 51
End: 2021-04-27

## 2021-04-27 DIAGNOSIS — M47.815 OSTEOARTHRITIS OF THORACOLUMBAR SPINE, UNSPECIFIED SPINAL OSTEOARTHRITIS COMPLICATION STATUS: Primary | ICD-10-CM

## 2021-04-27 NOTE — TELEPHONE ENCOUNTER
Pt states that he saw Dr. Maria M Bernal yesterday and she referred him to physical therapy. Pt states that he tried scheduling for it, but, was told there is no order in the system.  Per the patient he will also need a referral. Please, call pt when the order is in

## 2021-04-27 NOTE — TELEPHONE ENCOUNTER
Left message for patient to inform him that his referral for physical therapy has been ordered but will need to wait for insurance authorization. Once authorized we will let him know.

## 2021-05-14 ENCOUNTER — IMMUNIZATION (OUTPATIENT)
Dept: LAB | Facility: HOSPITAL | Age: 51
End: 2021-05-14
Attending: EMERGENCY MEDICINE
Payer: COMMERCIAL

## 2021-05-14 DIAGNOSIS — Z23 NEED FOR VACCINATION: Primary | ICD-10-CM

## 2021-05-14 PROCEDURE — 0012A SARSCOV2 VAC 100MCG/0.5ML IM: CPT

## 2021-05-27 ENCOUNTER — OFFICE VISIT (OUTPATIENT)
Dept: PHYSICAL THERAPY | Age: 51
End: 2021-05-27
Attending: FAMILY MEDICINE
Payer: COMMERCIAL

## 2021-05-27 DIAGNOSIS — M47.815 OSTEOARTHRITIS OF THORACOLUMBAR SPINE, UNSPECIFIED SPINAL OSTEOARTHRITIS COMPLICATION STATUS: ICD-10-CM

## 2021-05-27 PROCEDURE — 97163 PT EVAL HIGH COMPLEX 45 MIN: CPT | Performed by: PHYSICAL THERAPIST

## 2021-05-27 PROCEDURE — 97110 THERAPEUTIC EXERCISES: CPT | Performed by: PHYSICAL THERAPIST

## 2021-05-27 NOTE — PROGRESS NOTES
SPINE EVALUATION:   Referring Physician: Dr. Harriet George  Diagnosis: Osteoarthritis of thoracic spine , LBP with radicular symptoms.   Date of Service: 5/27/2021  8 visits authorized until August 2021     PATIENT SUMMARY   Erica Reynolds is a stiffness up from sitting, pain with stairs. . Pt and PT discussed evaluation findings, pathology, POC and HEP. Pt voiced understanding and performs HEP correctly without reported pain.  Skilled Physical Therapy is medically necessary to address the above making due to 1-2 personal factors/comorbidities, 3 body structures involved/activity limitations, and evolving symptoms including changing pain levels. PLAN OF CARE:    Goals: (to be met in 8-12 visits)   1.  Patient to report no pain into R leg with gloria

## 2021-06-03 ENCOUNTER — OFFICE VISIT (OUTPATIENT)
Dept: PHYSICAL THERAPY | Age: 51
End: 2021-06-03
Attending: FAMILY MEDICINE
Payer: COMMERCIAL

## 2021-06-03 DIAGNOSIS — M47.815 OSTEOARTHRITIS OF THORACOLUMBAR SPINE, UNSPECIFIED SPINAL OSTEOARTHRITIS COMPLICATION STATUS: ICD-10-CM

## 2021-06-03 PROCEDURE — 97110 THERAPEUTIC EXERCISES: CPT | Performed by: PHYSICAL THERAPIST

## 2021-06-03 NOTE — PROGRESS NOTES
Dx: LBP with radicular symptoms       Insurance (Authorized # of Visits):  2          Authorizing Physician: Dr. Canelo Hawkins  Next MD visit: none scheduled  Fall Risk: standard         Precautions: n/a             Subjective:Patient states overall feeli

## 2021-06-07 ENCOUNTER — TELEPHONE (OUTPATIENT)
Dept: PHYSICAL THERAPY | Facility: HOSPITAL | Age: 51
End: 2021-06-07

## 2021-06-07 ENCOUNTER — APPOINTMENT (OUTPATIENT)
Dept: PHYSICAL THERAPY | Age: 51
End: 2021-06-07
Attending: FAMILY MEDICINE
Payer: COMMERCIAL

## 2021-06-10 ENCOUNTER — OFFICE VISIT (OUTPATIENT)
Dept: PHYSICAL THERAPY | Age: 51
End: 2021-06-10
Attending: FAMILY MEDICINE
Payer: COMMERCIAL

## 2021-06-10 PROCEDURE — 97110 THERAPEUTIC EXERCISES: CPT | Performed by: PHYSICAL THERAPIST

## 2021-06-10 NOTE — PROGRESS NOTES
Dx: LBP with radicular symptoms       Insurance (Authorized # of Visits):  3          Authorizing Physician: Dr. Sukumar Wilkinson  Next MD visit: none scheduled  Fall Risk: standard         Precautions: n/a             Subjective:Patient states overall feeli

## 2021-06-14 ENCOUNTER — APPOINTMENT (OUTPATIENT)
Dept: PHYSICAL THERAPY | Age: 51
End: 2021-06-14
Attending: FAMILY MEDICINE
Payer: COMMERCIAL

## 2021-06-17 ENCOUNTER — TELEPHONE (OUTPATIENT)
Dept: PHYSICAL THERAPY | Facility: HOSPITAL | Age: 51
End: 2021-06-17

## 2021-06-17 ENCOUNTER — APPOINTMENT (OUTPATIENT)
Dept: PHYSICAL THERAPY | Age: 51
End: 2021-06-17
Attending: FAMILY MEDICINE
Payer: COMMERCIAL

## 2021-06-22 ENCOUNTER — APPOINTMENT (OUTPATIENT)
Dept: PHYSICAL THERAPY | Age: 51
End: 2021-06-22
Attending: FAMILY MEDICINE
Payer: COMMERCIAL

## 2021-07-13 ENCOUNTER — OFFICE VISIT (OUTPATIENT)
Dept: PHYSICAL THERAPY | Age: 51
End: 2021-07-13
Attending: FAMILY MEDICINE
Payer: COMMERCIAL

## 2021-07-13 PROCEDURE — 97110 THERAPEUTIC EXERCISES: CPT

## 2021-07-13 NOTE — PROGRESS NOTES
Dx: LBP with radicular symptoms       Insurance (Authorized # of Visits):  4          Authorizing Physician: Dr. Machelle Hull  Next MD visit: none scheduled  Fall Risk: standard         Precautions: n/a             Subjective:Patient states his lower marshal

## 2021-07-27 NOTE — ED NOTES
How Severe Is Your Acne?: mild Pt provided with discharge instructions, prescription sent to pts pharmacy. Verbalized understanding for plan of care at home and follow up. All questions/concerns addressed prior to discharge.    Pt wheeled out of er with family members Is This A New Presentation, Or A Follow-Up?: Acne Additional Comments (Use Complete Sentences): Mainly located on her forehead.

## 2021-12-06 ENCOUNTER — OFFICE VISIT (OUTPATIENT)
Dept: FAMILY MEDICINE CLINIC | Facility: CLINIC | Age: 51
End: 2021-12-06

## 2021-12-06 VITALS
HEIGHT: 64 IN | DIASTOLIC BLOOD PRESSURE: 81 MMHG | HEART RATE: 70 BPM | RESPIRATION RATE: 19 BRPM | BODY MASS INDEX: 29.96 KG/M2 | WEIGHT: 175.5 LBS | SYSTOLIC BLOOD PRESSURE: 137 MMHG

## 2021-12-06 DIAGNOSIS — E78.5 DYSLIPIDEMIA: ICD-10-CM

## 2021-12-06 DIAGNOSIS — R10.13 EPIGASTRIC PAIN: Primary | ICD-10-CM

## 2021-12-06 PROCEDURE — 99213 OFFICE O/P EST LOW 20 MIN: CPT | Performed by: FAMILY MEDICINE

## 2021-12-06 PROCEDURE — 3008F BODY MASS INDEX DOCD: CPT | Performed by: FAMILY MEDICINE

## 2021-12-06 PROCEDURE — 3079F DIAST BP 80-89 MM HG: CPT | Performed by: FAMILY MEDICINE

## 2021-12-06 PROCEDURE — 3075F SYST BP GE 130 - 139MM HG: CPT | Performed by: FAMILY MEDICINE

## 2021-12-06 RX ORDER — OMEPRAZOLE 20 MG/1
20 CAPSULE, DELAYED RELEASE ORAL
Qty: 30 CAPSULE | Refills: 3 | Status: SHIPPED | OUTPATIENT
Start: 2021-12-06 | End: 2021-12-13

## 2021-12-06 NOTE — PROGRESS NOTES
Evangelina Cotto is a 54year old male.   HPI:   Reports a 1 week history of pain in epigastric area after he drank water, he has noted that after intake of spicy or greasy food he has recurrence of pain, he described as a burning sensation with rad Mood and Affect: Mood normal.            ASSESSMENT AND PLAN:   1. Epigastric pain  Associated dyspepsia, will start trial of omeprazole, dietary recommendations discussed with patient  - omeprazole 20 MG Oral Capsule Delayed Release;  Take 1 capsule (

## 2021-12-06 NOTE — PATIENT INSTRUCTIONS
Dolor epigástrico (causa incierta)  El dolor epigástrico es dolor en la parte superior del abdomen. Puede ser un signo de enfermedad.  Las causas frecuentes incluyen las siguientes:   · Reflujo de ácido (el ácido del estómago sube hacia el esófago)  · [employer] medicamentos son de Eddye Cost o se puede conseguir lyman fórmula genérica. · Paxton un antiácido de 30 a 60 minutos después de comer y a la hora de WEDG[place]ARRUP, betzaida no a la misma hora que un bloqueador de ácido.   · Trate de no candi antinflamatorios no estero heces o en el vómito (color rojizo o negruzco)  · Siente debilidad o mareos, se desmaya o tiene problemas para respirar  · Chris de 100.4 °F (38 °C) o más royer, o según lo que le haya indicado lyman proveedor de atención médica  · Hinchazón en el abdomen  ·

## 2021-12-10 ENCOUNTER — LAB ENCOUNTER (OUTPATIENT)
Dept: LAB | Age: 51
End: 2021-12-10
Attending: FAMILY MEDICINE
Payer: COMMERCIAL

## 2021-12-10 ENCOUNTER — TELEPHONE (OUTPATIENT)
Dept: FAMILY MEDICINE CLINIC | Facility: CLINIC | Age: 51
End: 2021-12-10

## 2021-12-10 DIAGNOSIS — E78.5 DYSLIPIDEMIA: ICD-10-CM

## 2021-12-10 PROCEDURE — 80053 COMPREHEN METABOLIC PANEL: CPT

## 2021-12-10 PROCEDURE — 36415 COLL VENOUS BLD VENIPUNCTURE: CPT

## 2021-12-10 PROCEDURE — 80061 LIPID PANEL: CPT

## 2021-12-11 NOTE — TELEPHONE ENCOUNTER
Pt notified of 's [place] message for cmp. Informed lipid is still pending. Pt states still having epigastric pain last seen on 12/6. Pt would like appt to discuss other treatment options. Scheduled for 12/13 for OV with Jr Taveras.

## 2021-12-13 ENCOUNTER — OFFICE VISIT (OUTPATIENT)
Dept: FAMILY MEDICINE CLINIC | Facility: CLINIC | Age: 51
End: 2021-12-13

## 2021-12-13 VITALS
RESPIRATION RATE: 17 BRPM | HEIGHT: 64 IN | HEART RATE: 70 BPM | WEIGHT: 172.38 LBS | DIASTOLIC BLOOD PRESSURE: 67 MMHG | SYSTOLIC BLOOD PRESSURE: 129 MMHG | BODY MASS INDEX: 29.43 KG/M2

## 2021-12-13 DIAGNOSIS — R07.89 CHEST PRESSURE: ICD-10-CM

## 2021-12-13 DIAGNOSIS — R10.10 PAIN OF UPPER ABDOMEN: Primary | ICD-10-CM

## 2021-12-13 DIAGNOSIS — R10.13 EPIGASTRIC PAIN: ICD-10-CM

## 2021-12-13 PROCEDURE — 3074F SYST BP LT 130 MM HG: CPT | Performed by: FAMILY MEDICINE

## 2021-12-13 PROCEDURE — 3008F BODY MASS INDEX DOCD: CPT | Performed by: FAMILY MEDICINE

## 2021-12-13 PROCEDURE — 3078F DIAST BP <80 MM HG: CPT | Performed by: FAMILY MEDICINE

## 2021-12-13 PROCEDURE — 99214 OFFICE O/P EST MOD 30 MIN: CPT | Performed by: FAMILY MEDICINE

## 2021-12-13 RX ORDER — OMEPRAZOLE 20 MG/1
20 CAPSULE, DELAYED RELEASE ORAL
Qty: 30 CAPSULE | Refills: 3 | COMMUNITY
Start: 2021-12-13

## 2021-12-13 NOTE — PROGRESS NOTES
Froy Dolan is a 54year old male.   HPI:   Patient is here due to persistence of symptoms, he reports mild improvement of symptoms with medication but not complete resolution, he reports that while he was at home over the weekend his symptoms Normocephalic and atraumatic. Cardiovascular:      Rate and Rhythm: Normal rate and regular rhythm. Pulmonary:      Effort: Pulmonary effort is normal.      Breath sounds: Normal breath sounds.    Chest:       Abdominal:      General: Bowel sounds are n

## 2022-09-21 ENCOUNTER — LAB ENCOUNTER (OUTPATIENT)
Dept: LAB | Age: 52
End: 2022-09-21
Attending: FAMILY MEDICINE

## 2022-09-21 ENCOUNTER — EKG ENCOUNTER (OUTPATIENT)
Dept: LAB | Age: 52
End: 2022-09-21
Attending: FAMILY MEDICINE

## 2022-09-21 ENCOUNTER — OFFICE VISIT (OUTPATIENT)
Dept: FAMILY MEDICINE CLINIC | Facility: CLINIC | Age: 52
End: 2022-09-21

## 2022-09-21 VITALS
BODY MASS INDEX: 30.39 KG/M2 | WEIGHT: 178 LBS | HEIGHT: 64 IN | DIASTOLIC BLOOD PRESSURE: 94 MMHG | RESPIRATION RATE: 17 BRPM | HEART RATE: 65 BPM | SYSTOLIC BLOOD PRESSURE: 146 MMHG

## 2022-09-21 DIAGNOSIS — I10 ESSENTIAL HYPERTENSION: ICD-10-CM

## 2022-09-21 DIAGNOSIS — M79.601 PARESTHESIA AND PAIN OF BOTH UPPER EXTREMITIES: ICD-10-CM

## 2022-09-21 DIAGNOSIS — R07.89 CHEST PRESSURE: ICD-10-CM

## 2022-09-21 DIAGNOSIS — M54.2 NECK PAIN: ICD-10-CM

## 2022-09-21 DIAGNOSIS — R10.13 DYSPEPSIA: ICD-10-CM

## 2022-09-21 DIAGNOSIS — R10.13 DYSPEPSIA: Primary | ICD-10-CM

## 2022-09-21 DIAGNOSIS — R10.10 PAIN OF UPPER ABDOMEN: ICD-10-CM

## 2022-09-21 DIAGNOSIS — M79.602 PARESTHESIA AND PAIN OF BOTH UPPER EXTREMITIES: ICD-10-CM

## 2022-09-21 DIAGNOSIS — R20.2 PARESTHESIA AND PAIN OF BOTH UPPER EXTREMITIES: ICD-10-CM

## 2022-09-21 LAB
ALBUMIN SERPL-MCNC: 3.8 G/DL (ref 3.4–5)
ALBUMIN/GLOB SERPL: 1 {RATIO} (ref 1–2)
ALP LIVER SERPL-CCNC: 98 U/L
ALT SERPL-CCNC: 59 U/L
ANION GAP SERPL CALC-SCNC: 5 MMOL/L (ref 0–18)
AST SERPL-CCNC: 31 U/L (ref 15–37)
BASOPHILS # BLD AUTO: 0.05 X10(3) UL (ref 0–0.2)
BASOPHILS NFR BLD AUTO: 1 %
BILIRUB SERPL-MCNC: 1.7 MG/DL (ref 0.1–2)
BILIRUB UR QL: NEGATIVE
BUN BLD-MCNC: 14 MG/DL (ref 7–18)
BUN/CREAT SERPL: 13.3 (ref 10–20)
CALCIUM BLD-MCNC: 8.7 MG/DL (ref 8.5–10.1)
CHLORIDE SERPL-SCNC: 107 MMOL/L (ref 98–112)
CHOLEST SERPL-MCNC: 223 MG/DL (ref ?–200)
CLARITY UR: CLEAR
CO2 SERPL-SCNC: 29 MMOL/L (ref 21–32)
COLOR UR: YELLOW
CREAT BLD-MCNC: 1.05 MG/DL
DEPRECATED RDW RBC AUTO: 38.4 FL (ref 35.1–46.3)
EOSINOPHIL # BLD AUTO: 0.14 X10(3) UL (ref 0–0.7)
EOSINOPHIL NFR BLD AUTO: 2.7 %
ERYTHROCYTE [DISTWIDTH] IN BLOOD BY AUTOMATED COUNT: 12.4 % (ref 11–15)
FASTING PATIENT LIPID ANSWER: NO
FASTING STATUS PATIENT QL REPORTED: NO
GFR SERPLBLD BASED ON 1.73 SQ M-ARVRAT: 86 ML/MIN/1.73M2 (ref 60–?)
GLOBULIN PLAS-MCNC: 3.8 G/DL (ref 2.8–4.4)
GLUCOSE BLD-MCNC: 103 MG/DL (ref 70–99)
GLUCOSE UR-MCNC: NEGATIVE MG/DL
HCT VFR BLD AUTO: 51.1 %
HDLC SERPL-MCNC: 28 MG/DL (ref 40–59)
HGB BLD-MCNC: 17.8 G/DL
HGB UR QL STRIP.AUTO: NEGATIVE
IMM GRANULOCYTES # BLD AUTO: 0.02 X10(3) UL (ref 0–1)
IMM GRANULOCYTES NFR BLD: 0.4 %
KETONES UR-MCNC: NEGATIVE MG/DL
LDLC SERPL CALC-MCNC: 86 MG/DL (ref ?–100)
LEUKOCYTE ESTERASE UR QL STRIP.AUTO: NEGATIVE
LIPASE SERPL-CCNC: 155 U/L (ref 73–393)
LYMPHOCYTES # BLD AUTO: 1.75 X10(3) UL (ref 1–4)
LYMPHOCYTES NFR BLD AUTO: 33.8 %
MCH RBC QN AUTO: 29.8 PG (ref 26–34)
MCHC RBC AUTO-ENTMCNC: 34.8 G/DL (ref 31–37)
MCV RBC AUTO: 85.5 FL
MONOCYTES # BLD AUTO: 0.54 X10(3) UL (ref 0.1–1)
MONOCYTES NFR BLD AUTO: 10.4 %
NEUTROPHILS # BLD AUTO: 2.67 X10 (3) UL (ref 1.5–7.7)
NEUTROPHILS # BLD AUTO: 2.67 X10(3) UL (ref 1.5–7.7)
NEUTROPHILS NFR BLD AUTO: 51.7 %
NITRITE UR QL STRIP.AUTO: NEGATIVE
NONHDLC SERPL-MCNC: 195 MG/DL (ref ?–130)
OSMOLALITY SERPL CALC.SUM OF ELEC: 293 MOSM/KG (ref 275–295)
PH UR: 7 [PH] (ref 5–8)
PLATELET # BLD AUTO: 167 10(3)UL (ref 150–450)
POTASSIUM SERPL-SCNC: 3.8 MMOL/L (ref 3.5–5.1)
PROT SERPL-MCNC: 7.6 G/DL (ref 6.4–8.2)
PROT UR-MCNC: NEGATIVE MG/DL
RBC # BLD AUTO: 5.98 X10(6)UL
SODIUM SERPL-SCNC: 141 MMOL/L (ref 136–145)
SP GR UR STRIP: 1.02 (ref 1–1.03)
TRIGL SERPL-MCNC: 666 MG/DL (ref 30–149)
UROBILINOGEN UR STRIP-ACNC: 1
VLDLC SERPL CALC-MCNC: 111 MG/DL (ref 0–30)
WBC # BLD AUTO: 5.2 X10(3) UL (ref 4–11)

## 2022-09-21 PROCEDURE — 3080F DIAST BP >= 90 MM HG: CPT | Performed by: FAMILY MEDICINE

## 2022-09-21 PROCEDURE — 83690 ASSAY OF LIPASE: CPT

## 2022-09-21 PROCEDURE — 36415 COLL VENOUS BLD VENIPUNCTURE: CPT

## 2022-09-21 PROCEDURE — 80061 LIPID PANEL: CPT

## 2022-09-21 PROCEDURE — 99214 OFFICE O/P EST MOD 30 MIN: CPT | Performed by: FAMILY MEDICINE

## 2022-09-21 PROCEDURE — 81003 URINALYSIS AUTO W/O SCOPE: CPT

## 2022-09-21 PROCEDURE — 80053 COMPREHEN METABOLIC PANEL: CPT

## 2022-09-21 PROCEDURE — 3077F SYST BP >= 140 MM HG: CPT | Performed by: FAMILY MEDICINE

## 2022-09-21 PROCEDURE — 3008F BODY MASS INDEX DOCD: CPT | Performed by: FAMILY MEDICINE

## 2022-09-21 PROCEDURE — 93010 ELECTROCARDIOGRAM REPORT: CPT | Performed by: FAMILY MEDICINE

## 2022-09-21 PROCEDURE — 93005 ELECTROCARDIOGRAM TRACING: CPT

## 2022-09-21 PROCEDURE — 85025 COMPLETE CBC W/AUTO DIFF WBC: CPT

## 2022-09-21 PROCEDURE — 83013 H PYLORI (C-13) BREATH: CPT | Performed by: FAMILY MEDICINE

## 2022-09-21 RX ORDER — METOPROLOL SUCCINATE 25 MG/1
25 TABLET, EXTENDED RELEASE ORAL DAILY
Qty: 30 TABLET | Refills: 0 | Status: SHIPPED | OUTPATIENT
Start: 2022-09-21

## 2022-09-21 RX ORDER — METHOCARBAMOL 500 MG/1
500 TABLET, FILM COATED ORAL 2 TIMES DAILY PRN
Qty: 20 TABLET | Refills: 0 | Status: SHIPPED | OUTPATIENT
Start: 2022-09-21

## 2022-09-21 RX ORDER — OMEPRAZOLE 20 MG/1
20 CAPSULE, DELAYED RELEASE ORAL
Qty: 60 CAPSULE | Refills: 1 | Status: SHIPPED | OUTPATIENT
Start: 2022-09-21

## 2022-09-22 DIAGNOSIS — E78.1 HYPERTRIGLYCERIDEMIA: Primary | ICD-10-CM

## 2022-09-22 DIAGNOSIS — D75.1 ERYTHROCYTOSIS: ICD-10-CM

## 2022-09-22 RX ORDER — OMEGA-3-ACID ETHYL ESTERS 1 G/1
CAPSULE, LIQUID FILLED ORAL
Qty: 360 CAPSULE | Refills: 3 | Status: SHIPPED | OUTPATIENT
Start: 2022-09-22

## 2022-09-23 ENCOUNTER — LAB ENCOUNTER (OUTPATIENT)
Dept: LAB | Age: 52
End: 2022-09-23
Attending: FAMILY MEDICINE

## 2022-09-23 ENCOUNTER — HOSPITAL ENCOUNTER (OUTPATIENT)
Dept: GENERAL RADIOLOGY | Age: 52
Discharge: HOME | End: 2022-09-23
Attending: FAMILY MEDICINE

## 2022-09-23 DIAGNOSIS — R07.89 CHEST PRESSURE: ICD-10-CM

## 2022-09-23 DIAGNOSIS — D75.1 ERYTHROCYTOSIS: ICD-10-CM

## 2022-09-23 DIAGNOSIS — M54.2 NECK PAIN: ICD-10-CM

## 2022-09-23 LAB — H. PYLORI BREATH TEST: NEGATIVE

## 2022-09-23 PROCEDURE — 71101 X-RAY EXAM UNILAT RIBS/CHEST: CPT | Performed by: FAMILY MEDICINE

## 2022-09-23 PROCEDURE — 72050 X-RAY EXAM NECK SPINE 4/5VWS: CPT | Performed by: FAMILY MEDICINE

## 2022-09-24 DIAGNOSIS — M19.011 PRIMARY OSTEOARTHRITIS OF BOTH SHOULDERS: ICD-10-CM

## 2022-09-24 DIAGNOSIS — M47.812 OSTEOARTHRITIS OF CERVICAL SPINE, UNSPECIFIED SPINAL OSTEOARTHRITIS COMPLICATION STATUS: Primary | ICD-10-CM

## 2022-09-24 DIAGNOSIS — M19.012 PRIMARY OSTEOARTHRITIS OF BOTH SHOULDERS: ICD-10-CM

## 2022-10-03 ENCOUNTER — HOSPITAL ENCOUNTER (OUTPATIENT)
Dept: ULTRASOUND IMAGING | Age: 52
Discharge: HOME | End: 2022-10-03
Attending: FAMILY MEDICINE
Payer: COMMERCIAL

## 2022-10-03 DIAGNOSIS — R10.10 PAIN OF UPPER ABDOMEN: ICD-10-CM

## 2022-10-03 DIAGNOSIS — K76.9 LIVER LESION: Primary | ICD-10-CM

## 2022-10-03 PROCEDURE — 76700 US EXAM ABDOM COMPLETE: CPT | Performed by: FAMILY MEDICINE

## 2022-10-05 ENCOUNTER — OFFICE VISIT (OUTPATIENT)
Dept: FAMILY MEDICINE CLINIC | Facility: CLINIC | Age: 52
End: 2022-10-05
Payer: COMMERCIAL

## 2022-10-05 VITALS
HEART RATE: 69 BPM | WEIGHT: 180 LBS | HEIGHT: 64 IN | DIASTOLIC BLOOD PRESSURE: 75 MMHG | BODY MASS INDEX: 30.73 KG/M2 | SYSTOLIC BLOOD PRESSURE: 144 MMHG

## 2022-10-05 DIAGNOSIS — R10.13 DYSPEPSIA: Primary | ICD-10-CM

## 2022-10-05 DIAGNOSIS — M47.816 OSTEOARTHRITIS OF LUMBAR SPINE, UNSPECIFIED SPINAL OSTEOARTHRITIS COMPLICATION STATUS: ICD-10-CM

## 2022-10-05 DIAGNOSIS — F43.9 STRESS: ICD-10-CM

## 2022-10-05 DIAGNOSIS — I10 ESSENTIAL HYPERTENSION: ICD-10-CM

## 2022-10-05 DIAGNOSIS — M47.815 OSTEOARTHRITIS OF THORACOLUMBAR SPINE, UNSPECIFIED SPINAL OSTEOARTHRITIS COMPLICATION STATUS: ICD-10-CM

## 2022-10-05 PROCEDURE — 3077F SYST BP >= 140 MM HG: CPT | Performed by: FAMILY MEDICINE

## 2022-10-05 PROCEDURE — 3008F BODY MASS INDEX DOCD: CPT | Performed by: FAMILY MEDICINE

## 2022-10-05 PROCEDURE — 99214 OFFICE O/P EST MOD 30 MIN: CPT | Performed by: FAMILY MEDICINE

## 2022-10-05 PROCEDURE — 3078F DIAST BP <80 MM HG: CPT | Performed by: FAMILY MEDICINE

## 2022-10-05 RX ORDER — AMITRIPTYLINE HYDROCHLORIDE 25 MG/1
25 TABLET, FILM COATED ORAL NIGHTLY
Qty: 30 TABLET | Refills: 0 | Status: SHIPPED | OUTPATIENT
Start: 2022-10-05

## 2022-10-05 RX ORDER — METOPROLOL SUCCINATE 50 MG/1
50 TABLET, EXTENDED RELEASE ORAL DAILY
Qty: 30 TABLET | Refills: 1 | Status: SHIPPED | OUTPATIENT
Start: 2022-10-05 | End: 2022-11-04

## 2022-10-05 RX ORDER — FAMOTIDINE 40 MG/1
40 TABLET, FILM COATED ORAL NIGHTLY
Qty: 30 TABLET | Refills: 0 | Status: SHIPPED | OUTPATIENT
Start: 2022-10-05

## 2022-10-13 ENCOUNTER — HOSPITAL ENCOUNTER (OUTPATIENT)
Dept: CT IMAGING | Facility: HOSPITAL | Age: 52
Discharge: HOME | End: 2022-10-13
Attending: FAMILY MEDICINE
Payer: COMMERCIAL

## 2022-10-13 DIAGNOSIS — K76.9 LIVER LESION: Primary | ICD-10-CM

## 2022-10-13 DIAGNOSIS — K76.9 LIVER LESION: ICD-10-CM

## 2022-10-13 PROCEDURE — 74170 CT ABD WO CNTRST FLWD CNTRST: CPT | Performed by: FAMILY MEDICINE

## 2023-06-20 ENCOUNTER — HOSPITAL ENCOUNTER (OUTPATIENT)
Dept: GENERAL RADIOLOGY | Age: 53
Discharge: HOME | End: 2023-06-20
Attending: NURSE PRACTITIONER
Payer: COMMERCIAL

## 2023-06-20 ENCOUNTER — OFFICE VISIT (OUTPATIENT)
Dept: INTERNAL MEDICINE CLINIC | Facility: CLINIC | Age: 53
End: 2023-06-20

## 2023-06-20 ENCOUNTER — NURSE TRIAGE (OUTPATIENT)
Dept: FAMILY MEDICINE CLINIC | Facility: CLINIC | Age: 53
End: 2023-06-20

## 2023-06-20 ENCOUNTER — LAB ENCOUNTER (OUTPATIENT)
Dept: LAB | Age: 53
End: 2023-06-20
Attending: NURSE PRACTITIONER
Payer: COMMERCIAL

## 2023-06-20 VITALS
HEART RATE: 65 BPM | SYSTOLIC BLOOD PRESSURE: 142 MMHG | DIASTOLIC BLOOD PRESSURE: 87 MMHG | BODY MASS INDEX: 29.88 KG/M2 | WEIGHT: 175 LBS | HEIGHT: 64 IN

## 2023-06-20 DIAGNOSIS — L29.9 PRURITUS: ICD-10-CM

## 2023-06-20 DIAGNOSIS — R10.9 RIGHT FLANK PAIN: ICD-10-CM

## 2023-06-20 DIAGNOSIS — G89.29 CHRONIC RIGHT-SIDED LOW BACK PAIN, UNSPECIFIED WHETHER SCIATICA PRESENT: Primary | ICD-10-CM

## 2023-06-20 DIAGNOSIS — M54.9 BACK PAIN, UNSPECIFIED BACK LOCATION, UNSPECIFIED BACK PAIN LATERALITY, UNSPECIFIED CHRONICITY: Primary | ICD-10-CM

## 2023-06-20 DIAGNOSIS — M54.50 CHRONIC RIGHT-SIDED LOW BACK PAIN, UNSPECIFIED WHETHER SCIATICA PRESENT: Primary | ICD-10-CM

## 2023-06-20 LAB
ALBUMIN SERPL-MCNC: 4.1 G/DL (ref 3.4–5)
ALBUMIN/GLOB SERPL: 1.1 {RATIO} (ref 1–2)
ALP LIVER SERPL-CCNC: 89 U/L
ALT SERPL-CCNC: 40 U/L
ANION GAP SERPL CALC-SCNC: 9 MMOL/L (ref 0–18)
APPEARANCE: CLEAR
AST SERPL-CCNC: 29 U/L (ref 15–37)
BILIRUB SERPL-MCNC: 1.7 MG/DL (ref 0.1–2)
BILIRUB UR QL: NEGATIVE
BILIRUBIN: NEGATIVE
BUN BLD-MCNC: 9 MG/DL (ref 7–18)
BUN/CREAT SERPL: 9.4 (ref 10–20)
CALCIUM BLD-MCNC: 8.9 MG/DL (ref 8.5–10.1)
CHLORIDE SERPL-SCNC: 106 MMOL/L (ref 98–112)
CLARITY UR: CLEAR
CO2 SERPL-SCNC: 26 MMOL/L (ref 21–32)
CREAT BLD-MCNC: 0.96 MG/DL
FASTING STATUS PATIENT QL REPORTED: YES
GFR SERPLBLD BASED ON 1.73 SQ M-ARVRAT: 96 ML/MIN/1.73M2 (ref 60–?)
GLOBULIN PLAS-MCNC: 3.6 G/DL (ref 2.8–4.4)
GLUCOSE (URINE DIPSTICK): NEGATIVE MG/DL
GLUCOSE BLD-MCNC: 104 MG/DL (ref 70–99)
GLUCOSE UR-MCNC: NORMAL MG/DL
HGB UR QL STRIP.AUTO: NEGATIVE
KETONES (URINE DIPSTICK): NEGATIVE MG/DL
KETONES UR-MCNC: NEGATIVE MG/DL
LEUKOCYTE ESTERASE UR QL STRIP.AUTO: NEGATIVE
LEUKOCYTES: NEGATIVE
MULTISTIX LOT#: NORMAL NUMERIC
NITRITE UR QL STRIP.AUTO: NEGATIVE
NITRITE, URINE: NEGATIVE
OCCULT BLOOD: NEGATIVE
OSMOLALITY SERPL CALC.SUM OF ELEC: 291 MOSM/KG (ref 275–295)
PH UR: 6.5 [PH] (ref 5–8)
PH, URINE: 7 (ref 4.5–8)
POTASSIUM SERPL-SCNC: 3.7 MMOL/L (ref 3.5–5.1)
PROT SERPL-MCNC: 7.7 G/DL (ref 6.4–8.2)
PROT UR-MCNC: NEGATIVE MG/DL
PROTEIN (URINE DIPSTICK): NEGATIVE MG/DL
SODIUM SERPL-SCNC: 141 MMOL/L (ref 136–145)
SP GR UR STRIP: 1.01 (ref 1–1.03)
SPECIFIC GRAVITY: 1.01 (ref 1–1.03)
URINE-COLOR: YELLOW
UROBILINOGEN UR STRIP-ACNC: NORMAL
UROBILINOGEN,SEMI-QN: 0.2 MG/DL (ref 0–1.9)

## 2023-06-20 PROCEDURE — 87086 URINE CULTURE/COLONY COUNT: CPT

## 2023-06-20 PROCEDURE — 3077F SYST BP >= 140 MM HG: CPT | Performed by: NURSE PRACTITIONER

## 2023-06-20 PROCEDURE — 81003 URINALYSIS AUTO W/O SCOPE: CPT | Performed by: NURSE PRACTITIONER

## 2023-06-20 PROCEDURE — 99203 OFFICE O/P NEW LOW 30 MIN: CPT | Performed by: NURSE PRACTITIONER

## 2023-06-20 PROCEDURE — 36415 COLL VENOUS BLD VENIPUNCTURE: CPT

## 2023-06-20 PROCEDURE — 3008F BODY MASS INDEX DOCD: CPT | Performed by: NURSE PRACTITIONER

## 2023-06-20 PROCEDURE — 74018 RADEX ABDOMEN 1 VIEW: CPT | Performed by: NURSE PRACTITIONER

## 2023-06-20 PROCEDURE — 3079F DIAST BP 80-89 MM HG: CPT | Performed by: NURSE PRACTITIONER

## 2023-06-20 PROCEDURE — 80053 COMPREHEN METABOLIC PANEL: CPT

## 2023-06-20 RX ORDER — LORATADINE 10 MG/1
10 TABLET, ORALLY DISINTEGRATING ORAL DAILY
Qty: 90 TABLET | Refills: 0 | Status: SHIPPED | OUTPATIENT
Start: 2023-06-20

## 2023-10-13 ENCOUNTER — NURSE TRIAGE (OUTPATIENT)
Dept: FAMILY MEDICINE CLINIC | Facility: CLINIC | Age: 53
End: 2023-10-13

## 2023-10-13 ENCOUNTER — LAB ENCOUNTER (OUTPATIENT)
Dept: LAB | Age: 53
End: 2023-10-13
Attending: STUDENT IN AN ORGANIZED HEALTH CARE EDUCATION/TRAINING PROGRAM
Payer: COMMERCIAL

## 2023-10-13 ENCOUNTER — OFFICE VISIT (OUTPATIENT)
Dept: INTERNAL MEDICINE CLINIC | Facility: CLINIC | Age: 53
End: 2023-10-13

## 2023-10-13 ENCOUNTER — HOSPITAL ENCOUNTER (OUTPATIENT)
Dept: ULTRASOUND IMAGING | Facility: HOSPITAL | Age: 53
Discharge: HOME | End: 2023-10-13
Attending: STUDENT IN AN ORGANIZED HEALTH CARE EDUCATION/TRAINING PROGRAM
Payer: COMMERCIAL

## 2023-10-13 VITALS
OXYGEN SATURATION: 98 % | HEIGHT: 64 IN | SYSTOLIC BLOOD PRESSURE: 135 MMHG | HEART RATE: 56 BPM | DIASTOLIC BLOOD PRESSURE: 82 MMHG | TEMPERATURE: 98 F | BODY MASS INDEX: 30.05 KG/M2 | WEIGHT: 176 LBS

## 2023-10-13 DIAGNOSIS — R10.13 EPIGASTRIC PAIN: ICD-10-CM

## 2023-10-13 DIAGNOSIS — R10.13 EPIGASTRIC PAIN: Primary | ICD-10-CM

## 2023-10-13 LAB
ALBUMIN SERPL-MCNC: 4.1 G/DL (ref 3.4–5)
ALBUMIN/GLOB SERPL: 1 {RATIO} (ref 1–2)
ALP LIVER SERPL-CCNC: 99 U/L
ALT SERPL-CCNC: 54 U/L
ANION GAP SERPL CALC-SCNC: 7 MMOL/L (ref 0–18)
AST SERPL-CCNC: 27 U/L (ref 15–37)
BASOPHILS # BLD AUTO: 0.05 X10(3) UL (ref 0–0.2)
BASOPHILS NFR BLD AUTO: 1 %
BILIRUB SERPL-MCNC: 1.6 MG/DL (ref 0.1–2)
BUN BLD-MCNC: 11 MG/DL (ref 7–18)
BUN/CREAT SERPL: 10.3 (ref 10–20)
CALCIUM BLD-MCNC: 9.3 MG/DL (ref 8.5–10.1)
CHLORIDE SERPL-SCNC: 107 MMOL/L (ref 98–112)
CO2 SERPL-SCNC: 28 MMOL/L (ref 21–32)
CREAT BLD-MCNC: 1.07 MG/DL
DEPRECATED RDW RBC AUTO: 38.4 FL (ref 35.1–46.3)
EGFRCR SERPLBLD CKD-EPI 2021: 84 ML/MIN/1.73M2 (ref 60–?)
EOSINOPHIL # BLD AUTO: 0.1 X10(3) UL (ref 0–0.7)
EOSINOPHIL NFR BLD AUTO: 2 %
ERYTHROCYTE [DISTWIDTH] IN BLOOD BY AUTOMATED COUNT: 12.3 % (ref 11–15)
FASTING STATUS PATIENT QL REPORTED: YES
GLOBULIN PLAS-MCNC: 4 G/DL (ref 2.8–4.4)
GLUCOSE BLD-MCNC: 104 MG/DL (ref 70–99)
HCT VFR BLD AUTO: 51.2 %
HGB BLD-MCNC: 17.3 G/DL
IMM GRANULOCYTES # BLD AUTO: 0.02 X10(3) UL (ref 0–1)
IMM GRANULOCYTES NFR BLD: 0.4 %
LIPASE SERPL-CCNC: 37 U/L (ref 13–75)
LYMPHOCYTES # BLD AUTO: 1.43 X10(3) UL (ref 1–4)
LYMPHOCYTES NFR BLD AUTO: 28.3 %
MCH RBC QN AUTO: 29.1 PG (ref 26–34)
MCHC RBC AUTO-ENTMCNC: 33.8 G/DL (ref 31–37)
MCV RBC AUTO: 86.1 FL
MONOCYTES # BLD AUTO: 0.47 X10(3) UL (ref 0.1–1)
MONOCYTES NFR BLD AUTO: 9.3 %
NEUTROPHILS # BLD AUTO: 2.99 X10 (3) UL (ref 1.5–7.7)
NEUTROPHILS # BLD AUTO: 2.99 X10(3) UL (ref 1.5–7.7)
NEUTROPHILS NFR BLD AUTO: 59 %
OSMOLALITY SERPL CALC.SUM OF ELEC: 294 MOSM/KG (ref 275–295)
PLATELET # BLD AUTO: 150 10(3)UL (ref 150–450)
POTASSIUM SERPL-SCNC: 3.9 MMOL/L (ref 3.5–5.1)
PROT SERPL-MCNC: 8.1 G/DL (ref 6.4–8.2)
RBC # BLD AUTO: 5.95 X10(6)UL
SODIUM SERPL-SCNC: 142 MMOL/L (ref 136–145)
WBC # BLD AUTO: 5.1 X10(3) UL (ref 4–11)

## 2023-10-13 PROCEDURE — 99214 OFFICE O/P EST MOD 30 MIN: CPT | Performed by: STUDENT IN AN ORGANIZED HEALTH CARE EDUCATION/TRAINING PROGRAM

## 2023-10-13 PROCEDURE — 3075F SYST BP GE 130 - 139MM HG: CPT | Performed by: STUDENT IN AN ORGANIZED HEALTH CARE EDUCATION/TRAINING PROGRAM

## 2023-10-13 PROCEDURE — 76705 ECHO EXAM OF ABDOMEN: CPT | Performed by: STUDENT IN AN ORGANIZED HEALTH CARE EDUCATION/TRAINING PROGRAM

## 2023-10-13 PROCEDURE — 36415 COLL VENOUS BLD VENIPUNCTURE: CPT

## 2023-10-13 PROCEDURE — 3008F BODY MASS INDEX DOCD: CPT | Performed by: STUDENT IN AN ORGANIZED HEALTH CARE EDUCATION/TRAINING PROGRAM

## 2023-10-13 PROCEDURE — 80053 COMPREHEN METABOLIC PANEL: CPT

## 2023-10-13 PROCEDURE — 85025 COMPLETE CBC W/AUTO DIFF WBC: CPT

## 2023-10-13 PROCEDURE — 83690 ASSAY OF LIPASE: CPT

## 2023-10-13 PROCEDURE — 87338 HPYLORI STOOL AG IA: CPT

## 2023-10-13 PROCEDURE — 3079F DIAST BP 80-89 MM HG: CPT | Performed by: STUDENT IN AN ORGANIZED HEALTH CARE EDUCATION/TRAINING PROGRAM

## 2023-10-13 NOTE — TELEPHONE ENCOUNTER
Action Requested: Summary for Provider     []  Critical Lab, Recommendations Needed  [] Need Additional Advice  []   FYI    []   Need Orders  [] Need Medications Sent to Pharmacy  []  Other     SUMMARY: Pt reports abdominal pain 4/10, right upper, especially after eating. Nausea, no vomiting. No diarrhea, constipation, no fever. Denies urinary symptoms. Appointment scheduled with Dr. Daniel Hill today. No FM appointment available.    Future Appointments   Date Time Provider Camilla Bone   10/13/2023 11:30 AM Denae Christensen MD [facility]         Reason for call: Abdominal Pain (For 8 days)  Onset: Data Unavailable                     Reason for Disposition   Patient wants to be seen    Protocols used: Abdominal Pain - Male-A-[place]

## 2023-10-16 ENCOUNTER — TELEPHONE (OUTPATIENT)
Dept: FAMILY MEDICINE CLINIC | Facility: CLINIC | Age: 53
End: 2023-10-16

## 2023-10-16 LAB — H PYLORI AG STL QL IA: NEGATIVE

## 2023-10-16 RX ORDER — FAMOTIDINE 40 MG/1
40 TABLET, FILM COATED ORAL DAILY
Qty: 30 TABLET | Refills: 0 | Status: SHIPPED | OUTPATIENT
Start: 2023-10-16 | End: 2023-11-15

## 2023-10-16 NOTE — TELEPHONE ENCOUNTER
Dr. Marily Saravia welcome! And for future reference, please document under \"documentation\" , rather than  \"Routing Comments\", and make sure to send to the Triage Pool, rather than the individual. Thanks! Dee Arango MD  You1 hour ago (10:33 AM)     Frances Tidwell thank you.  Will look forward to seeing 10/20

## 2023-10-16 NOTE — TELEPHONE ENCOUNTER
Dr. Deirdre Boykin - has 10/20/23 appointment , pt understands further testing needed. Patient calling office, transferred to triage from Call Center. Language Line utilized.  ID # [id number], Griselda Ramming. RN informed of provider's message as detailed below. And let him know the famotidine prescription to his pharmacy this morning. He verbalizes understanding of all information, and agreeable to plan. Future Appointments   Date Time Provider Camilla Bone   10/20/2023 10:00 AM Patrick Eaton MD [facility]     10/13/23 Ultrasound Abdomen   US ABDOMEN LIMITED (BJM=[id number]): Result Notes     Ketan Britton MD  10/16/2023  8:19 AM CDT       Pt with epigastric pain, already on PPI and would recommend discontinue omeprazole for 2 weeks and come to see established PCP Dr. Santa Martin, and have repeat H.Pylori testing. His abdominal ultrasound showed Hepatic Steatosis, would recommend to schedule follow up with established PCP  order the following at next visit:   Anti-hepatitis C virus antibody.  -Hepatitis A IgG.   -Hepatitis B surface antigen, surface antibody, and core antibody.  -Plasma iron, ferritin, and total iron binding capacity.  -Serum gammaglobulin level, antinuclear antibody, antismooth muscle antibody, and anti-liver/kidney microsomal antibody-1         3:20 minutes hold - only got representative, verifying information

## 2023-10-16 NOTE — PROGRESS NOTES
Pt with epigastric pain, already on PPI and would recommend discontinue omeprazole for 2 weeks and come to see established PCP Dr. Verner Jean, and have repeat H.Pylori testing. His abdominal ultrasound showed Hepatic Steatosis, would recommend to schedule follow up with established PCP  order the following at next visit:    Anti-hepatitis C virus antibody.  -Hepatitis A IgG.   -Hepatitis B surface antigen, surface antibody, and core antibody.  -Plasma iron, ferritin, and total iron binding capacity.  -Serum gammaglobulin level, antinuclear antibody, antismooth muscle antibody, and anti-liver/kidney microsomal antibody-1

## 2023-10-16 NOTE — PROGRESS NOTES
Hepatic Function normal but ultrasound does show fatty liver.  Please make sure to call and schedule follow up with his established PCP Dr. Chandler Gurrola in 2 weeks,

## 2023-10-16 NOTE — TELEPHONE ENCOUNTER
Pt with hepatic Steatosis, and on omeprazole. Already spoke to RN triage, will discontinue omeprazole for 2 weeks and check h pyolri afterwards. Also hepatic steatosis on ultrasound, Anti-hepatitis C virus antibody.  -Hepatitis A IgG.   -Hepatitis B surface antigen, surface antibody, and core antibody.  -Plasma iron, ferritin, and total iron binding capacity.  -Serum gammaglobulin level, antinuclear antibody, antismooth muscle antibody, and anti-liver/kidney microsomal antibody-1   Will be ordered at upcoming visit on 10/20

## 2023-10-18 ENCOUNTER — OFFICE VISIT (OUTPATIENT)
Dept: INTERNAL MEDICINE CLINIC | Facility: CLINIC | Age: 53
End: 2023-10-18

## 2023-10-18 ENCOUNTER — LAB ENCOUNTER (OUTPATIENT)
Dept: LAB | Age: 53
End: 2023-10-18
Attending: STUDENT IN AN ORGANIZED HEALTH CARE EDUCATION/TRAINING PROGRAM
Payer: COMMERCIAL

## 2023-10-18 VITALS
WEIGHT: 172 LBS | BODY MASS INDEX: 29.37 KG/M2 | SYSTOLIC BLOOD PRESSURE: 132 MMHG | HEIGHT: 64 IN | DIASTOLIC BLOOD PRESSURE: 86 MMHG | HEART RATE: 60 BPM

## 2023-10-18 DIAGNOSIS — R10.9 ABDOMINAL PAIN, UNSPECIFIED ABDOMINAL LOCATION: Primary | ICD-10-CM

## 2023-10-18 DIAGNOSIS — A04.8 H. PYLORI INFECTION: ICD-10-CM

## 2023-10-18 DIAGNOSIS — K76.0 HEPATIC STEATOSIS: ICD-10-CM

## 2023-10-18 DIAGNOSIS — R10.9 ABDOMINAL PAIN, UNSPECIFIED ABDOMINAL LOCATION: ICD-10-CM

## 2023-10-18 PROCEDURE — 3075F SYST BP GE 130 - 139MM HG: CPT | Performed by: STUDENT IN AN ORGANIZED HEALTH CARE EDUCATION/TRAINING PROGRAM

## 2023-10-18 PROCEDURE — 3079F DIAST BP 80-89 MM HG: CPT | Performed by: STUDENT IN AN ORGANIZED HEALTH CARE EDUCATION/TRAINING PROGRAM

## 2023-10-18 PROCEDURE — 86803 HEPATITIS C AB TEST: CPT

## 2023-10-18 PROCEDURE — 36415 COLL VENOUS BLD VENIPUNCTURE: CPT

## 2023-10-18 PROCEDURE — 86708 HEPATITIS A ANTIBODY: CPT

## 2023-10-18 PROCEDURE — 87340 HEPATITIS B SURFACE AG IA: CPT

## 2023-10-18 PROCEDURE — 86709 HEPATITIS A IGM ANTIBODY: CPT

## 2023-10-18 PROCEDURE — 86706 HEP B SURFACE ANTIBODY: CPT

## 2023-10-18 PROCEDURE — 3008F BODY MASS INDEX DOCD: CPT | Performed by: STUDENT IN AN ORGANIZED HEALTH CARE EDUCATION/TRAINING PROGRAM

## 2023-10-18 PROCEDURE — 80503 PATH CLIN CONSLTJ SF 5-20: CPT

## 2023-10-18 PROCEDURE — 99214 OFFICE O/P EST MOD 30 MIN: CPT | Performed by: STUDENT IN AN ORGANIZED HEALTH CARE EDUCATION/TRAINING PROGRAM

## 2023-10-18 PROCEDURE — 86704 HEP B CORE ANTIBODY TOTAL: CPT

## 2023-10-18 RX ORDER — PANTOPRAZOLE SODIUM 40 MG/1
40 TABLET, DELAYED RELEASE ORAL
Qty: 28 TABLET | Refills: 0 | Status: SHIPPED | OUTPATIENT
Start: 2023-10-18 | End: 2023-11-01

## 2023-10-18 RX ORDER — METRONIDAZOLE 500 MG/1
500 TABLET ORAL EVERY 6 HOURS
Qty: 56 TABLET | Refills: 0 | Status: SHIPPED | OUTPATIENT
Start: 2023-10-18 | End: 2023-11-01

## 2023-10-18 RX ORDER — TETRACYCLINE HYDROCHLORIDE 500 MG/1
500 CAPSULE ORAL EVERY 6 HOURS
Qty: 56 CAPSULE | Refills: 0 | Status: SHIPPED | OUTPATIENT
Start: 2023-10-18 | End: 2023-11-01

## 2023-10-19 LAB
HAV AB SER QL IA: REACTIVE
HAV IGM SER QL: NONREACTIVE
HBV CORE AB SERPL QL IA: NONREACTIVE
HBV SURFACE AB SER QL: NONREACTIVE
HBV SURFACE AB SERPL IA-ACNC: <3.1 MIU/ML
HBV SURFACE AG SERPL QL IA: NONREACTIVE
HCV AB SERPL QL IA: NONREACTIVE

## 2023-10-30 NOTE — H&P
[facility] - Gastroenterology                                                                                                               Reason for consult: abd pain    Requesting physician or provider: Santy Hendricks. Katalina Martinez MD    Patient presents with:  Abdominal Pain  Gastro-esophageal Reflux  Nausea: After taking medication. HPI:   Lara Soto is a 48year old year-old male without significant medical history including diverticulitis,     he is here today for evaluation of abdominal pain  -He reports epigastric abdominal pain and right upper quadrant pain for about  6 weeks. Associated with postprandial fullness, heartburn, nausea. -he does report having a URI in September 2023  -He was first seen by provider on 10/13 and a repeat ultrasound of the abdomen was ordered which showed hepatic steatosis  He was seen by same  provider on 10/18 who ordered H. pylori quadruple therapy empirically. His H. pylori test was negative however the patient was on a PPI during this time. He has 4 days left of the treatment and feels a little bit of improvement  He has tried to avoid fried and fatty foods without improvement.  -he has been taking omeprazole for about 1 year  -10/13/23 labs: unremarkable CMP, CBC, lipase  -before taking antibiotics he was having constipation. Since starting antibiotics having daily normal bowel movements      Patient denies symptoms of vomiting,, dysphagia, odynophagia, globus sensation, hematemesis, change in bowel habits, constipation, diarrhea, hematochezia, or melena. he denies recent change in appetite, fever or unintentional weight loss. Last colonoscopy: 2019 Dr. Tay Fox - 10 year recall  Impression:  1. Sigmoid colon diverticulosis currently uncomplicated  2.  Otherwise normal colonoscopy to the terminal ileum     Last EGD: none     NSAIDS:none   Tobacco:none Alcohol:occasional   Marijuana:none   Illicit drugs:none     FH GI malignancy: Mom - liver cancer      No history of adverse reaction to sedation  No HARJINDER  No anticoagulants  No pacemaker/defibrillator  No pain medications and/or sleep aides    Wt Readings from Last 6 Encounters:  11/02/23 : 170 lb (77.1 kg)  10/18/23 : 172 lb (78 kg)  10/13/23 : 176 lb (79.8 kg)  06/20/23 : 175 lb (79.4 kg)  10/05/22 : 180 lb (81.6 kg)  09/21/22 : 178 lb (80.7 kg)       History, Medications, Allergies, ROS:      Past Medical History:   Diagnosis Date    Diverticulitis       Past Surgical History:   Procedure Laterality Date    COLONOSCOPY N/A 10/07/2019    Procedure: COLONOSCOPY;  Surgeon: Clarisa Andrade MD;  Location: [address] [place] [address] ENDOSCOPY    HERNIA SURGERY  2017      Family Hx:   Family History   Problem Relation Age of Onset    Hypertension Mother     Cancer Mother         Liver      Social History:   Social History     Socioeconomic History    Marital status:    Tobacco Use    Smoking status: Never     Passive exposure: Never    Smokeless tobacco: Never   Vaping Use    Vaping Use: Never used   Substance and Sexual Activity    Alcohol use: Yes     Comment: occ    Drug use: Never   Other Topics Concern    Caffeine Concern No    Exercise Yes   Social History Narrative    The patient does not use an assistive device. .      The patient does live in a home with stairs. Medications (Active prior to today's visit):  Current Outpatient Medications   Medication Sig Dispense Refill    famotidine 40 MG Oral Tab Take 1 tablet (40 mg total) by mouth daily. 30 tablet 0    Loratadine 10 MG Oral Tablet Dispersible Take 1 tablet (10 mg total) by mouth daily.  (Patient not taking: Reported on 10/13/2023) 90 tablet 0    omega-3-acid ethyl esters 1 g Oral Cap Take two capsules twice a day (Patient not taking: Reported on 11/2/2023) 360 capsule 3    omeprazole 20 MG Oral Capsule Delayed Release Take 1 capsule (20 mg total) by mouth 2 (two) times daily before meals. (Patient not taking: Reported on 6/20/2023) 60 capsule 1    methocarbamol 500 MG Oral Tab Take 1 tablet (500 mg total) by mouth 2 (two) times daily as needed (muscle pain). (Patient not taking: Reported on 6/20/2023) 20 tablet 0       Allergies:  No Known Allergies    ROS:   CONSTITUTIONAL: negative for fevers, chills, sweats  EYES Negative for scleral icterus or redness, and diplopia  HEENT: Negative for hoarseness  RESPIRATORY: Negative for cough and severe shortness of breath  CARDIOVASCULAR: Negative for crushing sub-sternal chest pain  GASTROINTESTINAL: See HPI  GENITOURINARY: Negative for dysuria  MUSCULOSKELETAL: Negative for arthralgias and myalgias  SKIN: Negative for jaundice, rash or pruritus  NEUROLOGICAL: Negative for dizziness and headaches  BEHAVIOR/PSYCH: Negative for psychotic behavior    PHYSICAL EXAM:   Blood pressure 147/79, pulse 76, height 5' 4\" (1.626 m), weight 170 lb (77.1 kg). GEN: Alert, no acute distress, well-nourished   HEENT: anicteric sclera, neck supple, trachea midline, MMM, no palpable or tender neck or supraclavicular lymph nodes  CV: RRR, the extremities are warm and well perfused   LUNGS: No increased work of breathing, CTAB  ABDOMEN: -+tender epigastric & RUQ abd. Soft, symmetrical,without distention or guarding. No scars or lesions. Aorta is without bruit or visible pulsation. Umbilicus is midline without herniation. Normoactive bowel sounds are present, No masses, hepatomegaly or splenomegaly noted. MSK: No erythema, no warmth, no swelling of joints  SKIN: No jaundice, no erythema, no rashes, no lesions  HEMATOLOGIC: No bleeding, no bruising  NEURO: Alert and interactive, BLACK  PSYCH: appropriate mood & affect    Labs/Imaging/Procedures:     Patient's pertinent labs and imaging were reviewed and discussed with patient today.         .  ASSESSMENT/PLAN:   Bijal Leroy is a 48year old year-old male without significant medical history including diverticulitis,     he is here today for evaluation of abdominal pain  -He reports epigastric abdominal pain and right upper quadrant pain for about  6 weeks. Associated with postprandial fullness, heartburn, nausea. -he does report having a URI in September 2023  -He was first seen by provider on 10/13 and a repeat ultrasound of the abdomen was ordered which showed hepatic steatosis  He was seen by same  provider on 10/18 who ordered H. pylori quadruple therapy empirically. His H. pylori test was negative however the patient was on a PPI during this time. He has 4 days left of the treatment and feels a little bit of improvement  He has tried to avoid fried and fatty foods without improvement.  -he has been taking omeprazole for about 1 year  -10/13/23 labs: unremarkable CMP, CBC, lipase  -before taking antibiotics he was having constipation. Since starting antibiotics having daily normal bowel movements  -+tender epigastric & RUQ abd  -Differentials include but not limited to: peptic ulcer disease, H. Pylori, GERD, medication-induced (NSAIDs), functional dyspepsia  Less likely GI malignancy due to lack of alarm features (fever, fatigue, unintentional weight loss)    Recommendations:  After you finish you H. Pylori treatment:    -can continue famotidine 40mg daily     -start a daily fiber supplement, like metamucil or citrucel    -if you still feel constipation after taking fiber, then also take miralax       -Recommendations for fatty liver disease:  -weight loss goal: lose 5-7% of body weight gradually (1-2lb per week)  -avoid alcohol  -improve cholesterol (take prescribed medications, eat healthy, exercise)  -recheck liver function tests in 6 months    -make follow up appt with me in 6 months    1. Schedule upper endoscopy (EGD) with Dr. Alexys Del Castillo  [Diagnosis: epigastric pain, GERD]    ENDOSCOPIC RISK BENEFIT DISCUSSION: I described the procedure in great detail with the patient.  I discussed the risks and benefits, including but not limited to: bleeding, perforation, infection, anesthesia complications, and even death. Patient will be NPO after midnight and will have a person physically present at time of pick-up to drive patient home. Patient verbalized understanding and agrees to proceed with procedure as planned.  # [id number] used for this patient exam and instructions. Orders This Visit:  No orders of the defined types were placed in this encounter. Meds This Visit:  Requested Prescriptions      No prescriptions requested or ordered in this encounter       Imaging & Referrals:  None      Regine Suggs [facility]  10/30/2023        This note was partially prepared using [email or web address] [facility] [employer] voice recognition dictation software. As a result, errors may occur. When identified, these errors have been corrected.  While every attempt is made to correct errors during dictation, discrepancies may still exist.

## 2023-11-02 ENCOUNTER — OFFICE VISIT (OUTPATIENT)
Facility: CLINIC | Age: 53
End: 2023-11-02

## 2023-11-02 VITALS
SYSTOLIC BLOOD PRESSURE: 147 MMHG | BODY MASS INDEX: 29.02 KG/M2 | DIASTOLIC BLOOD PRESSURE: 79 MMHG | WEIGHT: 170 LBS | HEIGHT: 64 IN | HEART RATE: 76 BPM

## 2023-11-02 DIAGNOSIS — R11.0 NAUSEA: ICD-10-CM

## 2023-11-02 DIAGNOSIS — R10.13 EPIGASTRIC PAIN: Primary | ICD-10-CM

## 2023-11-02 DIAGNOSIS — R12 HEARTBURN: ICD-10-CM

## 2023-11-02 DIAGNOSIS — R14.0 POSTPRANDIAL ABDOMINAL BLOATING: ICD-10-CM

## 2023-11-02 PROCEDURE — 99214 OFFICE O/P EST MOD 30 MIN: CPT

## 2023-11-02 PROCEDURE — 3078F DIAST BP <80 MM HG: CPT

## 2023-11-02 PROCEDURE — 3077F SYST BP >= 140 MM HG: CPT

## 2023-11-02 PROCEDURE — 3008F BODY MASS INDEX DOCD: CPT

## 2023-11-08 ENCOUNTER — TELEPHONE (OUTPATIENT)
Facility: CLINIC | Age: 53
End: 2023-11-08

## 2023-11-08 DIAGNOSIS — R10.13 EPIGASTRIC PAIN: Primary | ICD-10-CM

## 2023-11-08 DIAGNOSIS — K21.9 GASTROESOPHAGEAL REFLUX DISEASE, UNSPECIFIED WHETHER ESOPHAGITIS PRESENT: ICD-10-CM

## 2023-11-08 NOTE — TELEPHONE ENCOUNTER
Scheduled for:  [phone]  Provider Name:    Date:  1/12/2024  [facility]  Sedation:  MAC  Time:  11:30 am, (pt is aware that [address] will call the day before to confirm arrival time)  Prep:  NPO after midnight  Meds/Allergies Reconciled?: Leilani/ESTHER reviewed. Diagnosis with codes:  Epigatsric Pain R10.13, GERD K21.9  Was patient informed to call insurance with codes (Y/N):  Yes   Referral sent?: Referral was sent at the time of electronic surgical scheduling. [facility] or [address] notified?:  Electronic case request was sent to Moise Restrepo via [employer]. Medication Orders: Pt is aware to NOT take iron pills, herbal meds and diet supplements for 7 days before exam. Also to NOT take any form of alcohol, recreational drugs and any forms of ED meds 24 hours before exam.    Misc Orders:  N/A     Further instructions given by staff:  I discussed the prep instructions with the patient which he verbally understood. Provided patient with [place] prep instructions and cancellation policy at the time of office visit.

## 2023-12-26 DIAGNOSIS — R10.13 DYSPEPSIA: ICD-10-CM

## 2023-12-28 RX ORDER — OMEPRAZOLE 20 MG/1
20 CAPSULE, DELAYED RELEASE ORAL
Qty: 180 CAPSULE | Refills: 3 | Status: SHIPPED | OUTPATIENT
Start: 2023-12-28

## 2023-12-28 NOTE — TELEPHONE ENCOUNTER
Refill passed per [place] [facility] [place], [facility] protocol. Requested Prescriptions   Pending Prescriptions Disp Refills    omeprazole 20 MG Oral Capsule Delayed Release 60 capsule 1     Sig: Take 1 capsule (20 mg total) by mouth 2 (two) times daily before meals.        Gastrointestional Medication Protocol Failed - 12/26/2023  3:09 PM        Failed - In person appointment or virtual visit in the past 12 mos or appointment in next 3 mos     Recent Outpatient Visits              1 month ago Epigastric pain    [facility], [address] [facility] [facility], [facility] [place], ESTHER    Office Visit    2 months ago Abdominal pain, unspecified abdominal location    [facility], Marcus Driscoll MD    Office Visit    2 months ago [facility]igastric [facility]    Office Visit    6 months ago Chronic right-sided low back pain, unspecified whether sciatica present    [facility], [address], [place], J[facility] Lung., ESTHER    Office Visit    1 year ago Dyspepsia    [facility], [address], [place] Shahnaz Clark MD    Office Visit          Future Appointments         Provider Department Appt Notes    In 2 weeks [id number] [facility] [address], [address], [address] [facility], [place] EGD w/MAC @[facility]    In 4 months [place], [address] [facility], [address] [place] [address] Visits              1 month ago Epigastric pain    Qian Hallman, [address] [facility],[address] [facility], [place], [place], ESTHER    Office Visit    2 months ago Abdominal pain, unspecified abdominal location    Pat Harvey MD    Office Visit    2 months ago Epigastric pain    Pat Harvey MD    Office Visit    6 months ago Chronic right-sided low back pain, unspecified whether sciatica present    [facility], [address], [place] Kwabena Petersen Pi., APRN    Office Visit    1 year ago Dyspepsia    [facility], [address], [place] Reyna Galvan MD    Office Visit          Future Appointments         Provider Department Appt Notes    In 2 weeks [id number] [facility], [address] [facility] 20, [address], [place] [facility]    In 4 months [facility], [address], [address] [facility],[address], [facility]

## 2024-01-03 ENCOUNTER — OFFICE VISIT (OUTPATIENT)
Dept: INTERNAL MEDICINE CLINIC | Facility: CLINIC | Age: 54
End: 2024-01-03

## 2024-01-03 ENCOUNTER — TELEPHONE (OUTPATIENT)
Dept: INTERNAL MEDICINE CLINIC | Facility: CLINIC | Age: 54
End: 2024-01-03

## 2024-01-03 VITALS
OXYGEN SATURATION: 98 % | BODY MASS INDEX: 29.19 KG/M2 | WEIGHT: 171 LBS | DIASTOLIC BLOOD PRESSURE: 75 MMHG | SYSTOLIC BLOOD PRESSURE: 128 MMHG | HEART RATE: 59 BPM | HEIGHT: 64 IN

## 2024-01-03 DIAGNOSIS — M54.6 CHRONIC BILATERAL THORACIC BACK PAIN: ICD-10-CM

## 2024-01-03 DIAGNOSIS — G89.29 CHRONIC BILATERAL THORACIC BACK PAIN: ICD-10-CM

## 2024-01-03 DIAGNOSIS — R10.13 DYSPEPSIA: Primary | ICD-10-CM

## 2024-01-03 DIAGNOSIS — M54.2 CERVICAL PAIN (NECK): ICD-10-CM

## 2024-01-03 PROCEDURE — 3078F DIAST BP <80 MM HG: CPT | Performed by: STUDENT IN AN ORGANIZED HEALTH CARE EDUCATION/TRAINING PROGRAM

## 2024-01-03 PROCEDURE — 99214 OFFICE O/P EST MOD 30 MIN: CPT | Performed by: STUDENT IN AN ORGANIZED HEALTH CARE EDUCATION/TRAINING PROGRAM

## 2024-01-03 PROCEDURE — 3074F SYST BP LT 130 MM HG: CPT | Performed by: STUDENT IN AN ORGANIZED HEALTH CARE EDUCATION/TRAINING PROGRAM

## 2024-01-03 PROCEDURE — 3008F BODY MASS INDEX DOCD: CPT | Performed by: STUDENT IN AN ORGANIZED HEALTH CARE EDUCATION/TRAINING PROGRAM

## 2024-01-03 RX ORDER — MELOXICAM 15 MG/1
15 TABLET ORAL DAILY
Qty: 30 TABLET | Refills: 0 | Status: SHIPPED | OUTPATIENT
Start: 2024-01-10 | End: 2024-01-03

## 2024-01-03 RX ORDER — BACLOFEN 10 MG/1
10 TABLET ORAL 3 TIMES DAILY PRN
Qty: 90 TABLET | Refills: 0 | Status: SHIPPED | OUTPATIENT
Start: 2024-01-03 | End: 2024-02-02

## 2024-01-03 RX ORDER — FAMOTIDINE 40 MG/1
40 TABLET, FILM COATED ORAL DAILY
Qty: 90 TABLET | Refills: 0 | Status: SHIPPED | OUTPATIENT
Start: 2024-01-03 | End: 2024-04-02

## 2024-01-03 RX ORDER — METHYLPREDNISOLONE 4 MG/1
TABLET ORAL
Qty: 1 EACH | Refills: 0 | Status: SHIPPED | OUTPATIENT
Start: 2024-01-03

## 2024-01-03 NOTE — PROGRESS NOTES
OFFICE NOTE     Patient ID: Dom Borja is a 53 year old male.  Today's Date: 01/03/24  Chief Complaint: Pre-Op Exam (Sx: 1/12/24 ESOPHAGOGASTRODUODENOSCOPY (EGD)), Stomach Pain (Burning sensation, after eating. States had pain for 4 months but got worse after taking antibiotics in october), and Neck Pain (Neck/back pain)     Kristin [id number]      Patient is complaining of ongoing abominal pain, taking omeparzole 20mg po BID, but has strong abdominal pain after eating in his stomach. He is tentative planned to have EGD, and also complainng, and worsening over the past     Epigastric Pain  This is a chronic problem. The current episode started more than 1 month ago. The problem occurs constantly. The problem has been unchanged. Associated symptoms include abdominal pain, anorexia, arthralgias, a change in bowel habit, nausea and neck pain. Pertinent negatives include no chills, congestion, coughing, diaphoresis, fatigue, fever, headaches, joint swelling, numbness, rash, sore throat, swollen glands, urinary symptoms, vertigo, visual change, vomiting or weakness. The symptoms are aggravated by drinking and eating.   Neck Pain   This is a chronic problem. The current episode started in the past 7 days. The problem occurs constantly. The problem has been waxing and waning. The pain is associated with nothing. The pain is present in the left side. The quality of the pain is described as shooting. The pain is at a severity of 7/10. The pain is moderate. Pertinent negatives include no fever, headaches, numbness, visual change or weakness. He has tried acetaminophen for the symptoms.         Vitals:    01/03/24 1228   BP: 128/75   Pulse: 59   SpO2: 98%   Weight: 171 lb (77.6 kg)   Height: 5' 4\" (1.626 m)     body mass index is 29.35 kg/m².  BP Readings from Last 3 Encounters:   01/03/24 128/75   11/02/23 147/79   10/18/23 132/86     The 10-year ASCVD risk score (Sol CANCINO, et al.,  2019) is: 7.9%    Values used to calculate the score:      Age: 53 years      Sex: Male      Is Non- : No      Diabetic: No      Tobacco smoker: No      Systolic Blood Pressure: 128 mmHg      Is BP treated: No      HDL Cholesterol: 28 mg/dL      Total Cholesterol: 223 mg/dL      Medications reviewed:  Current Outpatient Medications   Medication Sig Dispense Refill    methylPREDNISolone 4 MG Oral Tablet Therapy Pack Take as directed with food. 1 each 0    baclofen 10 MG Oral Tab Take 1 tablet (10 mg total) by mouth 3 (three) times daily as needed. 90 tablet 0    famotidine 40 MG Oral Tab Take 1 tablet (40 mg total) by mouth daily. 90 tablet 0    Loratadine 10 MG Oral Tablet Dispersible Take 1 tablet (10 mg total) by mouth daily. (Patient not taking: Reported on 10/13/2023) 90 tablet 0    omega-3-acid ethyl esters 1 g Oral Cap Take two capsules twice a day (Patient not taking: Reported on 11/2/2023) 360 capsule 3         Assessment & Plan    1. Dyspepsia (Primary)  -     Famotidine; Take 1 tablet (40 mg total) by mouth daily.  Dispense: 90 tablet; Refill: 0  Went over GI note, pt was on omeprazole 20 BID not famotidien, changed to famotidine as recommended by GI APRN  Plan;  -EGD due for next week, aware to be NPO after midnight, follow up with GI for recommendations afterwards.   2. Cervical pain (neck)  -     methylPREDNISolone; Take as directed with food.  Dispense: 1 each; Refill: 0  -     Baclofen; Take 1 tablet (10 mg total) by mouth 3 (three) times daily as needed.  Dispense: 90 tablet; Refill: 0  -     Discontinue: Meloxicam; Take 1 tablet (15 mg total) by mouth daily.  Dispense: 30 tablet; Refill: 0  -     PHYSICAL THERAPY - INTERNAL  -     Physiatry Referral - In Network  Positive left Spurling test, with left neck/upper back pain  Plan:  -start medrol dose pack  -start baclofen 10mg po TID PRN  -avoid NSAIDS due to EGD/PUD?  -PT referral in-network (next available/open site), if not  availability next week said we could send out of network if approved by insurance.   -home exercise printed in AVS.   -Recommended to get TEMPUR-Neck™ Pillow (firm: purchase from [employer] or [employer]).  -Recommended to purchase RESTCLOUD Neck and Shoulder Relaxer, Cervical Traction Device for TMJ Pain Relief and Cervical Spine Alignment, Chiropractic Pillow Neck Stretcher (on [employer])- to lie down with foam insert behind neck and ground and extend neck and lie on ground for 5-15 minutes daily for neck pain relief and neck stretches provided in AVS in the interm till seen by Physical therapy    Neck Exercises: Active Neck Rotation  To start, lie on your back, knees bent and feet flat on the floor. Keep your ears, shoulders, and hips aligned, but don’t press your lower back to the floor. Rest your hands on your pelvis. Breathe deeply and relax.   Here are the steps for the active neck rotation:  Use your neck muscles to turn your head to one side until you feel a stretch in the muscles.  Hold for  5 seconds. Then turn to the other side.  Repeat  5 times on each side.  Note: Keep your shoulders on the floor. Don’t lift or tuck your chin as you turn your head.   [employer] last reviewed this educational content on 7/1/2020  © 2085-2485 [employer]. All rights reserved. This information is not intended as a substitute for professional medical care. Always follow your healthcare professional's instructions.        Neck Exercises: Overhead Arm Raise  To start, lie on your back, knees bent and feet flat on the floor. Keep your ears, shoulders, and hips aligned, but don’t press your lower back to the floor. Rest your hands on your pelvis. Breathe deeply and relax. Tighten the belly muscles to keep the back from arching.   Here are the steps for the arm lift:  Raise one arm overhead, then lower it. As you lower that arm, raise the other arm.  Continue to move both arms in slow, smooth arcs. Keep your arms  straight and your head and neck relaxed.  Repeat  10 times with each arm.  For your safety, check with your healthcare provider before starting an exercise program.   [employer] last reviewed this educational content on 7/1/2020  © 8712-2256 [employer]. All rights reserved. This information is not intended as a substitute for professional medical care. Always follow your healthcare professional's instructions.        Neck Exercises: Head Lifts    Do this exercise on your hands and knees. Keep your knees under your hips and your hands under your shoulders. Keep your spine in a neutral position (not arched or sagging). Keep your ears in line with your shoulders. Hold for a few seconds before starting the exercise:  Keeping your back straight, slowly drop your chin toward your chest. Tuck in your chin.  Hold for 5 seconds. Then lift your head until your neck is level with your back.  Hold for 5 seconds. Repeat 5 to10 times.  [employer] last reviewed this educational content on 3/1/2018  © 1500-6574 [employer]. All rights reserved. This information is not intended as a substitute for professional medical care. Always follow your healthcare professional's instructions.        Neck Exercises: Neck and Torso Rotation   To start, lie on your back, knees bent and feet flat on the floor. Keep your ears, shoulders, and hips aligned, but don’t press your lower back to the floor. Breathe deeply and relax.  From starting position, drop both knees to one side. At the same time, turn your head and look in the other direction.  Keep both feet in contact with the floor and keep your arms at your sides.  Hold for 5 seconds. Then slowly switch sides.  Repeat 5 to 10 times.  [employer] last reviewed this educational content on 3/1/2018  © 0245-3455 [employer]. All rights reserved. This information is not intended as a substitute for professional medical care. Always follow your healthcare  professional's instructions.        Neck Exercises: Neck Flex  To start, sit in a chair with your feet flat on the floor. Your weight should be slightly forward so that you’re balanced evenly on your buttocks. Relax your shoulders and keep your head level. Avoid arching your back or rounding your shoulders. Using a chair with arms may help you keep your balance:  Rest the back of your left hand against your lower back. Place your right palm on the top of your head.  Gently pull your head forward and down until you feel a stretch in the muscles in the back of your neck. Don’t force the motion.  Hold for 20 seconds, then return to starting position. Switch arms.  Repeat 5 to 10 times.    [employer] last reviewed this educational content on 3/1/2018  © 4529-3646 [employer]. All rights reserved. This information is not intended as a substitute for professional medical care. Always follow your healthcare professional's instructions.        Neck Exercises: Neck Rotation    To start, lie on your back, knees bent and feet flat on the floor. Keep your ears, shoulders, and hips aligned, but don’t press your lower back to the floor. Rest your hands on your pelvis. Breathe deeply and relax.   Here are the steps for passive neck rotation. There are 2 ways to do this exercise:    With hand. With your neck relaxed, place the palm of one hand on your forehead. Use your hand to turn your head to one side (over your shoulder) until you feel a stretch in the neck muscles. Don't push through pain.  Without hand. With your neck relaxed, turn your head to one side (over your shoulder) until you feel a stretch in the neck muscles. Don't push through pain.  Hold for  5 seconds. Then turn to the other side.  Repeat  5 times on each side.   Note: Keep your shoulders on the floor. Don’t lift your chin as you turn your head.   [employer] last reviewed this educational content on 7/1/2020 © 2000-2020 [employer]. All  rights reserved. This information is not intended as a substitute for professional medical care. Always follow your healthcare professional's instructions.    3. Chronic bilateral thoracic back pain  -     methylPREDNISolone; Take as directed with food.  Dispense: 1 each; Refill: 0  -     Baclofen; Take 1 tablet (10 mg total) by mouth 3 (three) times daily as needed.  Dispense: 90 tablet; Refill: 0  -     Discontinue: Meloxicam; Take 1 tablet (15 mg total) by mouth daily.  Dispense: 30 tablet; Refill: 0  -     PHYSICAL THERAPY - INTERNAL  -     Physiatry Referral - In Network        Follow Up: As needed/if symptoms worsen or Return in about 4 weeks (around 1/31/2024) for Physical Examination..         Objective/ Results:   Physical Exam  HENT:      Head: Normocephalic and atraumatic.   Cardiovascular:      Rate and Rhythm: Normal rate and regular rhythm.   Pulmonary:      Effort: Pulmonary effort is normal.      Breath sounds: Normal breath sounds.   Abdominal:      General: Bowel sounds are normal. There is distension.      Palpations: Abdomen is soft. There is no mass.      Tenderness: There is no abdominal tenderness. There is no rebound.   Musculoskeletal:      Cervical back: Tenderness and bony tenderness present. Pain with movement present. Decreased range of motion.      Thoracic back: Tenderness present. Decreased range of motion.      Comments: Positive Spurlings Left    Neurological:      General: No focal deficit present.      Mental Status: He is alert and oriented to person, place, and time.   Psychiatric:         Mood and Affect: Mood normal.        Reviewed:    There are no problems to display for this patient.     No Known Allergies     Social History     Socioeconomic History    Marital status:    Tobacco Use    Smoking status: Never     Passive exposure: Never    Smokeless tobacco: Never   Vaping Use    Vaping Use: Never used   Substance and Sexual Activity    Alcohol use: Yes     Comment:  occ    Drug use: Never   Other Topics Concern    Caffeine Concern No    Exercise Yes   Social History Narrative    The patient does not use an assistive device..      The patient does live in a home with stairs.      Review of Systems   Constitutional:  Negative for chills, diaphoresis, fatigue and fever.   HENT:  Negative for congestion and sore throat.    Respiratory:  Negative for cough.    Gastrointestinal:  Positive for abdominal pain, anorexia, change in bowel habit and nausea. Negative for vomiting.   Musculoskeletal:  Positive for arthralgias, back pain, neck pain and neck stiffness. Negative for joint swelling.   Skin:  Negative for rash.   Neurological:  Negative for vertigo, weakness, numbness and headaches.     All other systems negative unless otherwise stated in ROS or HPI above.       Ceasar Frazier MD  Internal Medicine       Call office with any questions or seek emergency care if necessary.   Patient understands and agrees to follow directions and advice.      ----------------------------------------- PATIENT INSTRUCTIONS-----------------------------------------     There are no Patient Instructions on file for this visit.

## 2024-01-03 NOTE — TELEPHONE ENCOUNTER
Patient seen today in office by Dr. Ceasar Frazier. Would like to change pcp to Dr. Ceasar Frazier. Advised patient to call insurance. please remove any prior pcp and change to Ceasar Frazier  Pcp removal order placed

## 2024-01-04 ENCOUNTER — PATIENT OUTREACH (OUTPATIENT)
Dept: CASE MANAGEMENT | Age: 54
End: 2024-01-04

## 2024-01-04 NOTE — PROCEDURES
Received order requesting to update PCP to Dr. Ceasar Frazier is Denied and finalized on January 4, 2024.    [facility] patient is listed on the December 2023 [facility] eligibility list:  [facility] Attributed PCP is Dr. Muriel Morgan  Updated PCP field to reflect Dr. Morgan    Office, please contact patient to verify PCP. Inform patient they must contact [employer] to provide the name of current PCP.   [facility] Members can change their PCP on the [employer] website, by emailing [email or web address]   [facility] Members can call [employer] [facility] customer service at [phone]  [facility] PCP assignment is handled by [facility], [facility]    Office should also mail patient a letter to contact office to schedule an appointment or contact [employer] [facility] to update their PCP.    After office has verified patient is no longer listed on the [facility] monthly eligibility list and not assigned to Dr. Morgan then office must resubmit a new order for PCP removal request.      Thanks,  [facility] Team

## 2024-01-12 ENCOUNTER — TELEPHONE (OUTPATIENT)
Facility: CLINIC | Age: 54
End: 2024-01-12

## 2024-01-12 ENCOUNTER — NURSE TRIAGE (OUTPATIENT)
Dept: INTERNAL MEDICINE CLINIC | Facility: CLINIC | Age: 54
End: 2024-01-12

## 2024-01-12 PROCEDURE — 88312 SPECIAL STAINS GROUP 1: CPT | Performed by: INTERNAL MEDICINE

## 2024-01-12 PROCEDURE — 88305 TISSUE EXAM BY PATHOLOGIST: CPT | Performed by: INTERNAL MEDICINE

## 2024-01-12 NOTE — TELEPHONE ENCOUNTER
Dr Barriga    Called and spoke to the patient per language line  assistance Tony ID#[id number]. /name verified due to message from his PCP RN, the patient called them and c/o abdominal pain.    S/p EGD today.    He relates he ate a cracker and drank apple juice post procedure.  He started c/o reflux when he was in his car.    Pain level 8/10.    He denies vomiting, sob or chest discomfort.    Last dose of famotidine was yesterday.   Advised the patient to take famotidine now and ED  precautions if experiencing sob, chest pains and severe abdominal pain.    As per the  the patient expressed understanding.
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
## 2024-01-12 NOTE — TELEPHONE ENCOUNTER
I left a message on the patient's voicemail with the assistance of Devin of [employer] services ID#[id number].  The patient reported pain after his procedure.  Message left that he should present to the ED if his symptoms persist/worsen.    I subsequently contacted the patient's wife Emily.  Dom began experiencing pain immediately after having juice and crackers.  The pain has lessened somewhat and is now 5/10.  We discussed that pain after endoscopy is not typical.  If the patient's symptoms do not continue to improve, worsen or he develops a fever or cannot eat he should promptly present to the ED.  She acknowledged the instructions.  We will contact them with the biopsy results when available.

## 2024-01-15 ENCOUNTER — OFFICE VISIT (OUTPATIENT)
Dept: INTERNAL MEDICINE CLINIC | Facility: CLINIC | Age: 54
End: 2024-01-15

## 2024-01-15 VITALS
OXYGEN SATURATION: 99 % | DIASTOLIC BLOOD PRESSURE: 85 MMHG | HEIGHT: 64 IN | WEIGHT: 170 LBS | HEART RATE: 62 BPM | BODY MASS INDEX: 29.02 KG/M2 | SYSTOLIC BLOOD PRESSURE: 132 MMHG

## 2024-01-15 DIAGNOSIS — K29.30 CHRONIC SUPERFICIAL GASTRITIS WITHOUT BLEEDING: ICD-10-CM

## 2024-01-15 DIAGNOSIS — D18.1: ICD-10-CM

## 2024-01-15 DIAGNOSIS — K76.0 HEPATIC STEATOSIS: Primary | ICD-10-CM

## 2024-01-15 DIAGNOSIS — R10.13 EPIGASTRIC PAIN: ICD-10-CM

## 2024-01-15 PROCEDURE — 3075F SYST BP GE 130 - 139MM HG: CPT | Performed by: STUDENT IN AN ORGANIZED HEALTH CARE EDUCATION/TRAINING PROGRAM

## 2024-01-15 PROCEDURE — 3008F BODY MASS INDEX DOCD: CPT | Performed by: STUDENT IN AN ORGANIZED HEALTH CARE EDUCATION/TRAINING PROGRAM

## 2024-01-15 PROCEDURE — 99214 OFFICE O/P EST MOD 30 MIN: CPT | Performed by: STUDENT IN AN ORGANIZED HEALTH CARE EDUCATION/TRAINING PROGRAM

## 2024-01-15 PROCEDURE — 3079F DIAST BP 80-89 MM HG: CPT | Performed by: STUDENT IN AN ORGANIZED HEALTH CARE EDUCATION/TRAINING PROGRAM

## 2024-01-15 RX ORDER — SUCRALFATE 1 G/1
1 TABLET ORAL
Qty: 360 TABLET | Refills: 1 | Status: SHIPPED | OUTPATIENT
Start: 2024-01-15 | End: 2024-07-13

## 2024-01-15 NOTE — PROGRESS NOTES
OFFICE NOTE     Patient ID: Dom Borja is a 53 year old male.  Today's Date: 01/15/24  Chief Complaint: Abdominal Pain     Des [id number]    Pt is a 54y/o Mongolian speaking male well known to me with Hepatic Steatosis, recent treated H.Pylori, ongoing post-prandial epigastric pain, presents to clinic after EGD done Friday 1/12 with post procedure epigastric pain. After juice and crackers, and a sharp/stabbing epigastric pain after EGD afternoon and all night. Sat and Sunday not as much pain, clears did have some tortillas.     Epigastric Pain  This is a chronic problem. The problem occurs intermittently. The problem has been waxing and waning. Associated symptoms include abdominal pain, anorexia, nausea and vomiting. Pertinent negatives include no arthralgias, change in bowel habit, chest pain, chills, congestion, coughing, diaphoresis, fatigue, fever, headaches, joint swelling, myalgias, neck pain, numbness, rash, sore throat, swollen glands, urinary symptoms, visual change or weakness. The symptoms are aggravated by eating and drinking. He has tried rest and sleep for the symptoms.         Vitals:    01/15/24 0835   BP: 132/85   Pulse: 62   SpO2: 99%   Weight: 170 lb (77.1 kg)   Height: 5' 4\" (1.626 m)     body mass index is 29.18 kg/m².  BP Readings from Last 3 Encounters:   01/15/24 132/85   01/12/24 118/85   01/03/24 128/75     The 10-year ASCVD risk score (Sol CANCINO, et al., 2019) is: 8.4%    Values used to calculate the score:      Age: 53 years      Sex: Male      Is Non- : No      Diabetic: No      Tobacco smoker: No      Systolic Blood Pressure: 132 mmHg      Is BP treated: No      HDL Cholesterol: 28 mg/dL      Total Cholesterol: 223 mg/dL      Medications reviewed:  Current Outpatient Medications   Medication Sig Dispense Refill    sucralfate (CARAFATE) 1 g Oral Tab Take 1 tablet (1 g total) by mouth 4 (four) times daily before meals and  nightly. 360 tablet 1    famotidine 40 MG Oral Tab Take 1 tablet (40 mg total) by mouth daily. 90 tablet 0    omeprazole 20 MG Oral Capsule Delayed Release Take 20 mg by mouth in the morning, at noon, and at bedtime. (Patient not taking: Reported on 1/15/2024)      methylPREDNISolone 4 MG Oral Tablet Therapy Pack Take as directed with food. (Patient not taking: Reported on 1/15/2024) 1 each 0    baclofen 10 MG Oral Tab Take 1 tablet (10 mg total) by mouth 3 (three) times daily as needed. (Patient not taking: Reported on 1/15/2024) 90 tablet 0    omega-3-acid ethyl esters 1 g Oral Cap Take two capsules twice a day (Patient not taking: Reported on 11/2/2023) 360 capsule 3         Assessment & Plan    1. Hepatic steatosis (Primary)  2. Epigastric pain  -     Sucralfate; Take 1 tablet (1 g total) by mouth 4 (four) times daily before meals and nightly.  Dispense: 360 tablet; Refill: 1  3. Chronic superficial gastritis without bleeding  -     Sucralfate; Take 1 tablet (1 g total) by mouth 4 (four) times daily before meals and nightly.  Dispense: 360 tablet; Refill: 1  4. Lymphangioma of small intestine  -     Sucralfate; Take 1 tablet (1 g total) by mouth 4 (four) times daily before meals and nightly.  Dispense: 360 tablet; Refill: 1  Reviewed Patients EGD pathology report: mild chronic gastritis in stomach, and dudodenum with Lymphangioma (likely benign), but with episatric pain, H.Pylori is negative on EGD  Plan:  -continue famotidine for now  -Start sulcrafate QID ACHS and monitor symptom response follow up with GI for further evaluation if symptoms do not improve  -follow up with myself in 1 week for improvement, and 3 months for physical examination.       Follow Up: As needed/if symptoms worsen or Return in about 3 months (around 4/15/2024) for Physical Exam..         Objective/ Results:   Physical Exam  Constitutional:       Appearance: He is well-developed.   Cardiovascular:      Rate and Rhythm: Normal rate and  regular rhythm.      Heart sounds: Normal heart sounds.   Pulmonary:      Effort: Pulmonary effort is normal.      Breath sounds: Normal breath sounds.   Abdominal:      General: Bowel sounds are normal.      Palpations: Abdomen is soft.   Skin:     General: Skin is warm and dry.   Neurological:      Mental Status: He is alert and oriented to person, place, and time.      Deep Tendon Reflexes: Reflexes are normal and symmetric.        Reviewed:    There are no problems to display for this patient.     No Known Allergies     Social History     Socioeconomic History    Marital status:    Tobacco Use    Smoking status: Never     Passive exposure: Never    Smokeless tobacco: Never   Vaping Use    Vaping Use: Never used   Substance and Sexual Activity    Alcohol use: Yes     Comment: occ    Drug use: Never   Other Topics Concern    Caffeine Concern No    Exercise Yes   Social History Narrative    The patient does not use an assistive device..      The patient does live in a home with stairs.      Review of Systems   Constitutional:  Negative for chills, diaphoresis, fatigue and fever.   HENT:  Negative for congestion and sore throat.    Respiratory:  Negative for cough.    Cardiovascular:  Negative for chest pain.   Gastrointestinal:  Positive for abdominal pain, anorexia, nausea and vomiting. Negative for change in bowel habit.   Musculoskeletal:  Negative for arthralgias, joint swelling, myalgias and neck pain.   Skin:  Negative for rash.   Neurological:  Negative for weakness, numbness and headaches.     All other systems negative unless otherwise stated in ROS or HPI above.       Ceasar Frazier MD  Internal Medicine       Call office with any questions or seek emergency care if necessary.   Patient understands and agrees to follow directions and advice.      ----------------------------------------- PATIENT INSTRUCTIONS-----------------------------------------     There are no Patient Instructions on file for  this visit.

## 2024-01-16 ENCOUNTER — TELEPHONE (OUTPATIENT)
Facility: CLINIC | Age: 54
End: 2024-01-16

## 2024-01-16 NOTE — TELEPHONE ENCOUNTER
Left voicemail on patient's phone. Then called his wife Emily who answered.    # [id number] used for this patient exam and instructions.    Wife reports post EGD he was having pain after eating, even if just water and crackers  PCP prescribed sucralfate for post prandial pain, patient started this medication yesterday and is improving. Advised him to stay on either omeprazole 40mg daily or famotidine 40mg daily. Whichever he has better symptom relief with.     Currently has f/u appt with me in May 2024. Advised patient to call Friday or Monday for sooner appt if he continues to have abdominal pain so we can discuss more options. May consider dicyclomine or tricyclic for visceral hypersensitivity.    Leilani Coyle, APRN

## 2024-01-17 NOTE — TELEPHONE ENCOUNTER
Called the patient per language line  assistance Yoli ID#[id number].    The patient did not answer the call so I called his spouse, Emily (on BOOM), patient /name verified.    She stated the patient is still symptomatic. C/o feeling full up to the neck after eating and still has tolerable abdominal pain.    Advised to eat small portion of food at dinner and elevate head with 2 pillows when laying, avoid greasy and spicy, any red sauces.    She stated he is taking one of the PPI and sucralfate, advised to continue taking.    Your Appointments        2024  2:00 PM  Follow Up Visit with ESTHER Prieto  [facility] [address] [id number] [phone]     She verbalized understanding of info given and she asked to cancel May 3 appt.    May appt canceled.

## 2024-01-17 NOTE — TELEPHONE ENCOUNTER
Called the patient per language line  assistance Farhan ID#[id number]    /name verified.    The patient was informed that this writer spoke to his spouse, his appt is rescheduled.    He was appreciative of the call.

## 2024-01-25 ENCOUNTER — OFFICE VISIT (OUTPATIENT)
Dept: INTERNAL MEDICINE CLINIC | Facility: CLINIC | Age: 54
End: 2024-01-25

## 2024-01-25 VITALS
WEIGHT: 169 LBS | SYSTOLIC BLOOD PRESSURE: 123 MMHG | HEART RATE: 61 BPM | DIASTOLIC BLOOD PRESSURE: 72 MMHG | HEIGHT: 64 IN | BODY MASS INDEX: 28.85 KG/M2

## 2024-01-25 DIAGNOSIS — D18.1: ICD-10-CM

## 2024-01-25 DIAGNOSIS — K29.30 CHRONIC SUPERFICIAL GASTRITIS WITHOUT BLEEDING: ICD-10-CM

## 2024-01-25 DIAGNOSIS — R10.13 EPIGASTRIC PAIN: Primary | ICD-10-CM

## 2024-01-25 DIAGNOSIS — K58.1 IRRITABLE BOWEL SYNDROME WITH CONSTIPATION: ICD-10-CM

## 2024-01-25 PROCEDURE — 3008F BODY MASS INDEX DOCD: CPT | Performed by: STUDENT IN AN ORGANIZED HEALTH CARE EDUCATION/TRAINING PROGRAM

## 2024-01-25 PROCEDURE — 99214 OFFICE O/P EST MOD 30 MIN: CPT | Performed by: STUDENT IN AN ORGANIZED HEALTH CARE EDUCATION/TRAINING PROGRAM

## 2024-01-25 PROCEDURE — 3074F SYST BP LT 130 MM HG: CPT | Performed by: STUDENT IN AN ORGANIZED HEALTH CARE EDUCATION/TRAINING PROGRAM

## 2024-01-25 PROCEDURE — 3078F DIAST BP <80 MM HG: CPT | Performed by: STUDENT IN AN ORGANIZED HEALTH CARE EDUCATION/TRAINING PROGRAM

## 2024-01-25 RX ORDER — DICYCLOMINE HYDROCHLORIDE 10 MG/1
10 CAPSULE ORAL 4 TIMES DAILY
Qty: 120 CAPSULE | Refills: 0 | Status: SHIPPED | OUTPATIENT
Start: 2024-01-25 | End: 2024-02-24

## 2024-01-25 NOTE — PROGRESS NOTES
OFFICE NOTE     Patient ID: Dom Borja is a 53 year old male.  Today's Date: 01/25/24  Chief Complaint: Follow - Up (U after endoscopy on 1/12/24)    Jared [id number]  Pt is a 52y/o Sammarinese speaking male well known to me with Hepatic Steatosis, recent treated H.Pylori, ongoing post-prandial epigastric pain (s/p EGD 1/12/2024 with Gastritis, normal biopsy-visceral hypersensitivity), seen by myself 1/15 on Carafate feels about 60% better, and states 40% of the time has constipation. Overall improving, eating mostly vegetables and fruits.       Vitals:    01/25/24 1625   BP: 123/72   Pulse: 61   Weight: 169 lb (76.7 kg)   Height: 5' 4\" (1.626 m)     body mass index is 29.01 kg/m².  BP Readings from Last 3 Encounters:   01/25/24 123/72   01/15/24 132/85   01/12/24 118/85     The 10-year ASCVD risk score (Sol DK, et al., 2019) is: 7.4%    Values used to calculate the score:      Age: 53 years      Sex: Male      Is Non- : No      Diabetic: No      Tobacco smoker: No      Systolic Blood Pressure: 123 mmHg      Is BP treated: No      HDL Cholesterol: 28 mg/dL      Total Cholesterol: 223 mg/dL      Medications reviewed:  Current Outpatient Medications   Medication Sig Dispense Refill    dicyclomine 10 MG Oral Cap Take 1 capsule (10 mg total) by mouth 4 (four) times daily. 120 capsule 0    sucralfate (CARAFATE) 1 g Oral Tab Take 1 tablet (1 g total) by mouth 4 (four) times daily before meals and nightly. 360 tablet 1    omeprazole 20 MG Oral Capsule Delayed Release Take 1 capsule (20 mg total) by mouth in the morning, at noon, and at bedtime.           Assessment & Plan    1. Epigastric pain (Primary)  -     Dicyclomine HCl; Take 1 capsule (10 mg total) by mouth 4 (four) times daily.  Dispense: 120 capsule; Refill: 0  2. Chronic superficial gastritis without bleeding  -     Dicyclomine HCl; Take 1 capsule (10 mg total) by mouth 4 (four) times daily.  Dispense: 120 capsule;  Refill: 0  3. Irritable bowel syndrome with constipation  -     Dicyclomine HCl; Take 1 capsule (10 mg total) by mouth 4 (four) times daily.  Dispense: 120 capsule; Refill: 0  4. Lymphangioma of small intestine  -     Dicyclomine HCl; Take 1 capsule (10 mg total) by mouth 4 (four) times daily.  Dispense: 120 capsule; Refill: 0  Pt with epigastric pain with constipation, ? IBS with constipation  Plan:  -continue carafate QID and start Dicycloamine 1 capsule QID   -follow up with GI and myself next week for annual physical examination.       Follow Up: As needed/if symptoms worsen or Return in about 1 week (around 2/1/2024) for Physical Exam..         Objective/ Results:   Physical Exam  Constitutional:       Appearance: He is well-developed.   Cardiovascular:      Rate and Rhythm: Normal rate and regular rhythm.      Heart sounds: Normal heart sounds.   Pulmonary:      Effort: Pulmonary effort is normal.      Breath sounds: Normal breath sounds.   Abdominal:      General: Bowel sounds are normal.      Palpations: Abdomen is soft.   Skin:     General: Skin is warm and dry.   Neurological:      Mental Status: He is alert and oriented to person, place, and time.      Deep Tendon Reflexes: Reflexes are normal and symmetric.        Reviewed:    There are no problems to display for this patient.     No Known Allergies     Social History     Socioeconomic History    Marital status:    Tobacco Use    Smoking status: Never     Passive exposure: Never    Smokeless tobacco: Never   Vaping Use    Vaping Use: Never used   Substance and Sexual Activity    Alcohol use: Yes     Comment: occ    Drug use: Never   Other Topics Concern    Caffeine Concern No    Exercise Yes   Social History Narrative    The patient does not use an assistive device..      The patient does live in a home with stairs.      Review of Systems   Constitutional: Negative.    Respiratory: Negative.     Cardiovascular: Negative.    Gastrointestinal:   Positive for abdominal pain and constipation.   Skin: Negative.    Neurological: Negative.      All other systems negative unless otherwise stated in ROS or HPI above.       Ceasar Frazier MD  Internal Medicine       Call office with any questions or seek emergency care if necessary.   Patient understands and agrees to follow directions and advice.      ----------------------------------------- PATIENT INSTRUCTIONS-----------------------------------------     There are no Patient Instructions on file for this visit.

## 2024-02-01 ENCOUNTER — OFFICE VISIT (OUTPATIENT)
Dept: INTERNAL MEDICINE CLINIC | Facility: CLINIC | Age: 54
End: 2024-02-01

## 2024-02-01 VITALS
HEIGHT: 64 IN | BODY MASS INDEX: 28.68 KG/M2 | HEART RATE: 63 BPM | WEIGHT: 168 LBS | SYSTOLIC BLOOD PRESSURE: 113 MMHG | DIASTOLIC BLOOD PRESSURE: 71 MMHG

## 2024-02-01 DIAGNOSIS — D18.1: ICD-10-CM

## 2024-02-01 DIAGNOSIS — K29.30 CHRONIC SUPERFICIAL GASTRITIS WITHOUT BLEEDING: ICD-10-CM

## 2024-02-01 DIAGNOSIS — R10.13 EPIGASTRIC PAIN: ICD-10-CM

## 2024-02-01 DIAGNOSIS — K76.0 HEPATIC STEATOSIS: ICD-10-CM

## 2024-02-01 DIAGNOSIS — K58.1 IRRITABLE BOWEL SYNDROME WITH CONSTIPATION: ICD-10-CM

## 2024-02-01 DIAGNOSIS — R68.82 REDUCED LIBIDO: ICD-10-CM

## 2024-02-01 DIAGNOSIS — Z00.00 ANNUAL PHYSICAL EXAM: Primary | ICD-10-CM

## 2024-02-01 PROCEDURE — 3078F DIAST BP <80 MM HG: CPT | Performed by: STUDENT IN AN ORGANIZED HEALTH CARE EDUCATION/TRAINING PROGRAM

## 2024-02-01 PROCEDURE — G0438 PPPS, INITIAL VISIT: HCPCS | Performed by: STUDENT IN AN ORGANIZED HEALTH CARE EDUCATION/TRAINING PROGRAM

## 2024-02-01 PROCEDURE — 3074F SYST BP LT 130 MM HG: CPT | Performed by: STUDENT IN AN ORGANIZED HEALTH CARE EDUCATION/TRAINING PROGRAM

## 2024-02-01 PROCEDURE — 3008F BODY MASS INDEX DOCD: CPT | Performed by: STUDENT IN AN ORGANIZED HEALTH CARE EDUCATION/TRAINING PROGRAM

## 2024-02-01 PROCEDURE — 99396 PREV VISIT EST AGE 40-64: CPT | Performed by: STUDENT IN AN ORGANIZED HEALTH CARE EDUCATION/TRAINING PROGRAM

## 2024-02-01 RX ORDER — SILDENAFIL 50 MG/1
50 TABLET, FILM COATED ORAL
Qty: 30 TABLET | Refills: 3 | Status: SHIPPED | OUTPATIENT
Start: 2024-02-01 | End: 2024-05-31

## 2024-02-01 NOTE — PROGRESS NOTES
History of Present Illness   Patient ID: Dom Borja is a 53 year old male.  Chief Complaint: Physical    WENDY [id number]    Dom oBrja is a pleasant 53 year old male h Hepatic Steatosis, recent treated H.Pylori, ongoing post-prandial epigastric pain (s/p EGD 1/12/2024 with Gastritis, normal biopsy-visceral hypersensitivity) on carafate and last started on dicycolamine 10mg QID who presents for annual physical exam. Dom Borja is states his dyspepsia/abdominal pain is 70% better on carafate and bentyl and states he has loss of libido and poor erections, last PCP had given him viagra in the past with good effect but was expensive.       Health Maintenance  - All care gaps addressed with patient.   Health Maintenance Due   Topic Date Due    Annual Physical  Never done    PSA  Never done    COVID-19 Vaccine (3 - 2023-24 season) 09/01/2023     Review of Systems  Review of Systems   Constitutional: Negative.    Eyes: Negative.    Respiratory: Negative.     Cardiovascular: Negative.    Gastrointestinal:  Positive for abdominal distention, abdominal pain and nausea. Negative for anal bleeding, constipation, diarrhea, rectal pain and vomiting.   Endocrine: Negative.    Genitourinary: Negative.    Musculoskeletal: Negative.    Skin: Negative.    Allergic/Immunologic: Negative.    Neurological: Negative.    Hematological: Negative.    Psychiatric/Behavioral: Negative.         Physical Exam  Vitals:    02/01/24 1617   BP: 113/71   Pulse: 63   Weight: 168 lb (76.2 kg)   Height: 5' 4\" (1.626 m)     Body mass index is 28.84 kg/m².  BP Readings from Last 3 Encounters:   02/01/24 113/71   01/25/24 123/72   01/15/24 132/85     Physical Exam  Vitals reviewed.   Constitutional:       Appearance: Normal appearance.   HENT:      Head: Normocephalic and atraumatic.      Right Ear: Tympanic membrane normal.      Left Ear: Tympanic membrane normal.      Nose: Nose normal.   Eyes:      Extraocular  Movements: Extraocular movements intact.      Pupils: Pupils are equal, round, and reactive to light.   Cardiovascular:      Rate and Rhythm: Normal rate and regular rhythm.      Pulses: Normal pulses.      Heart sounds: Normal heart sounds.   Pulmonary:      Effort: Pulmonary effort is normal.      Breath sounds: Normal breath sounds.   Abdominal:      General: Bowel sounds are normal.      Palpations: Abdomen is soft.   Musculoskeletal:         General: Normal range of motion.      Cervical back: Normal range of motion and neck supple.   Skin:     Capillary Refill: Capillary refill takes less than 2 seconds.   Neurological:      General: No focal deficit present.      Mental Status: He is alert and oriented to person, place, and time. Mental status is at baseline.   Psychiatric:         Mood and Affect: Mood normal.           Labs & Imaging  Pertinent labs and imaging reviewed.   Lab Results   Component Value Date     (H) 10/13/2023    BUN 11 10/13/2023    BUNCREA 10.3 10/13/2023    CREATSERUM 1.07 10/13/2023    ANIONGAP 7 10/13/2023    GFRNAA 80 12/10/2021    GFRAA 93 12/10/2021    CA 9.3 10/13/2023    OSMOCALC 294 10/13/2023    ALKPHO 99 10/13/2023    AST 27 10/13/2023    ALT 54 10/13/2023    BILT 1.6 10/13/2023    TP 8.1 10/13/2023    ALB 4.1 10/13/2023    GLOBULIN 4.0 10/13/2023     10/13/2023    K 3.9 10/13/2023     10/13/2023    CO2 28.0 10/13/2023     No results found for: \"EAG\", \"A1C\"  Lab Results   Component Value Date    WBC 5.1 10/13/2023    RBC 5.95 (H) 10/13/2023    HGB 17.3 10/13/2023    HCT 51.2 10/13/2023    MCV 86.1 10/13/2023    MCH 29.1 10/13/2023    MCHC 33.8 10/13/2023    RDW 12.3 10/13/2023    .0 10/13/2023     Lab Results   Component Value Date    CHOLEST 223 (H) 09/21/2022    TRIG 666 (H) 09/21/2022    HDL 28 (L) 09/21/2022    LDL 86 09/21/2022    VLDL 111 (H) 09/21/2022    NONHDLC 195 (H) 09/21/2022     The 10-year ASCVD risk score (Sol CANCINO, et al., 2019) is:  6.4%    Values used to calculate the score:      Age: 53 years      Sex: Male      Is Non- : No      Diabetic: No      Tobacco smoker: No      Systolic Blood Pressure: 113 mmHg      Is BP treated: No      HDL Cholesterol: 28 mg/dL      Total Cholesterol: 223 mg/dL    Medical History    Reviewed allergies:  No Known Allergies     Reviewed:  There are no problems to display for this patient.     Reviewed:  Past Medical History:   Diagnosis Date    Diverticulitis     Gastritis       Reviewed:  Family History   Problem Relation Age of Onset    Hypertension Mother     Cancer Mother         Liver       Reviewed:  Past Surgical History:   Procedure Laterality Date    COLONOSCOPY N/A 10/07/2019    Procedure: COLONOSCOPY;  Surgeon: Linus Chinchilla MD;  Location: [facility] ENDOSCOPY    HERNIA SURGERY  2017      Reviewed:  Social History     Socioeconomic History    Marital status:    Tobacco Use    Smoking status: Never     Passive exposure: Never    Smokeless tobacco: Never   Vaping Use    Vaping Use: Never used   Substance and Sexual Activity    Alcohol use: Yes     Comment: occ    Drug use: Never   Other Topics Concern    Caffeine Concern No    Exercise Yes   Social History Narrative    The patient does not use an assistive device..      The patient does live in a home with stairs.      Reviewed:  Current Outpatient Medications   Medication Sig Dispense Refill    Sildenafil Citrate 50 MG Oral Tab Take 1 tablet (50 mg total) by mouth daily as needed for Erectile Dysfunction. 30 tablet 3    dicyclomine 10 MG Oral Cap Take 1 capsule (10 mg total) by mouth 4 (four) times daily. 120 capsule 0    sucralfate (CARAFATE) 1 g Oral Tab Take 1 tablet (1 g total) by mouth 4 (four) times daily before meals and nightly. 360 tablet 1    omeprazole 20 MG Oral Capsule Delayed Release Take 1 capsule (20 mg total) by mouth in the morning, at noon, and at bedtime.            Assessment & Plan    1. Annual  physical exam  - PSA, Total W Reflex To Free; Future  - Vitamin D; Future  - TSH W Reflex To Free T4; Future  - Lipid Panel; Future  - CBC With Differential With Platelet; Future  - Hemoglobin A1C; Future  - Comp Metabolic Panel (14); Future  - CT CALCIUM SCORING; Future  Patient here for physical exam and due for following.  Plan:  -order the following Labs: CBC, CMP, Lipid Panel, A1C, TSH, Vitamin D, PSA  -Referral to GI for screening colonoscopy       2. Chronic superficial gastritis without bleeding  Followed by GI, s/p EGD Jan 2024 with gastritis.   Plan:  -continue Carafate QID and dicyclomine. Follow up with GI for ?colonoscopy  3. Hepatic steatosis  Followed by GI, s/p EGD Jan 2024 with gastritis.   Plan:  -continue Carafate QID and dicyclomine. Follow up with GI for ?colonoscopy  4. Epigastric pain  Followed by GI, s/p EGD Jan 2024 with gastritis.   Plan:  -continue Carafate QID and dicyclomine. Follow up with GI for ?colonoscopy  5. Lymphangioma of small intestine  Followed by GI, s/p EGD Jan 2024 with gastritis.   Plan:  -continue Carafate QID and dicyclomine. Follow up with GI for ?colonoscopy  -continue PPI  6. Irritable bowel syndrome with constipation  Followed by GI, s/p EGD Jan 2024 with gastritis.   Plan:  -continue Carafate QID and dicyclomine. Follow up with GI for ?colonoscopy    7. Reduced libido  - Testosterone Total [E]; Future  - Urology Referral - In Network  Pt with decreased libido,  Plan;  Check AM testosterone,  -Start Viagra 50mg po before intercourse #30 with 2 refills  -follow up with urology pending above if abnormal testosterone and further recommendations        Follow Up:   No follow-ups on file.      Ceasar Frazier MD  Internal Medicine      Patient asked to sign release of information for outside records if not already requested, make future office/imaging appointments at the  prior to leaving, and to sign up for [facility] if not already active.  Preventive measures and  further education discussed with patient as per after visit summary. Potential medication side effects discussed. All questions answered to best of ability.   Call office with any questions. Seek emergency care if necessary.   Patient understands and agrees to follow directions and advice.      ----------------------------------------- PATIENT INSTRUCTIONS-----------------------------------------     There are no Patient Instructions on file for this visit.

## 2024-02-16 ENCOUNTER — LAB ENCOUNTER (OUTPATIENT)
Dept: LAB | Age: 54
End: 2024-02-16
Attending: STUDENT IN AN ORGANIZED HEALTH CARE EDUCATION/TRAINING PROGRAM
Payer: COMMERCIAL

## 2024-02-16 ENCOUNTER — TELEPHONE (OUTPATIENT)
Dept: INTERNAL MEDICINE CLINIC | Facility: CLINIC | Age: 54
End: 2024-02-16

## 2024-02-16 DIAGNOSIS — Z00.00 ANNUAL PHYSICAL EXAM: ICD-10-CM

## 2024-02-16 DIAGNOSIS — R68.82 REDUCED LIBIDO: ICD-10-CM

## 2024-02-16 LAB
ALBUMIN SERPL-MCNC: 4.6 G/DL (ref 3.2–4.8)
ALBUMIN/GLOB SERPL: 1.4 {RATIO} (ref 1–2)
ALP LIVER SERPL-CCNC: 102 U/L
ALT SERPL-CCNC: 25 U/L
ANION GAP SERPL CALC-SCNC: 6 MMOL/L (ref 0–18)
AST SERPL-CCNC: 22 U/L (ref ?–34)
BILIRUB SERPL-MCNC: 2 MG/DL (ref 0.3–1.2)
BUN BLD-MCNC: 11 MG/DL (ref 9–23)
BUN/CREAT SERPL: 11.7 (ref 10–20)
CALCIUM BLD-MCNC: 9.8 MG/DL (ref 8.7–10.4)
CHLORIDE SERPL-SCNC: 107 MMOL/L (ref 98–112)
CHOLEST SERPL-MCNC: 204 MG/DL (ref ?–200)
CO2 SERPL-SCNC: 30 MMOL/L (ref 21–32)
CREAT BLD-MCNC: 0.94 MG/DL
EGFRCR SERPLBLD CKD-EPI 2021: 98 ML/MIN/1.73M2 (ref 60–?)
EST. AVERAGE GLUCOSE BLD GHB EST-MCNC: 103 MG/DL (ref 68–126)
FASTING PATIENT LIPID ANSWER: YES
FASTING STATUS PATIENT QL REPORTED: YES
GLOBULIN PLAS-MCNC: 3.3 G/DL (ref 2.8–4.4)
GLUCOSE BLD-MCNC: 92 MG/DL (ref 70–99)
HBA1C MFR BLD: 5.2 % (ref ?–5.7)
HDLC SERPL-MCNC: 34 MG/DL (ref 40–59)
LDLC SERPL CALC-MCNC: 119 MG/DL (ref ?–100)
NONHDLC SERPL-MCNC: 170 MG/DL (ref ?–130)
OSMOLALITY SERPL CALC.SUM OF ELEC: 295 MOSM/KG (ref 275–295)
POTASSIUM SERPL-SCNC: 4.1 MMOL/L (ref 3.5–5.1)
PROT SERPL-MCNC: 7.9 G/DL (ref 5.7–8.2)
PSA SERPL-MCNC: 1.26 NG/ML (ref ?–4)
SODIUM SERPL-SCNC: 143 MMOL/L (ref 136–145)
TESTOST SERPL-MCNC: 709.57 NG/DL
TRIGL SERPL-MCNC: 291 MG/DL (ref 30–149)
TSI SER-ACNC: 3.3 MIU/ML (ref 0.55–4.78)
VIT D+METAB SERPL-MCNC: 10.7 NG/ML (ref 30–100)
VLDLC SERPL CALC-MCNC: 52 MG/DL (ref 0–30)

## 2024-02-16 PROCEDURE — 84153 ASSAY OF PSA TOTAL: CPT

## 2024-02-16 PROCEDURE — 82306 VITAMIN D 25 HYDROXY: CPT

## 2024-02-16 PROCEDURE — 36415 COLL VENOUS BLD VENIPUNCTURE: CPT

## 2024-02-16 PROCEDURE — 85060 BLOOD SMEAR INTERPRETATION: CPT

## 2024-02-16 PROCEDURE — 80061 LIPID PANEL: CPT

## 2024-02-16 PROCEDURE — 80053 COMPREHEN METABOLIC PANEL: CPT

## 2024-02-16 PROCEDURE — 85025 COMPLETE CBC W/AUTO DIFF WBC: CPT

## 2024-02-16 PROCEDURE — 83036 HEMOGLOBIN GLYCOSYLATED A1C: CPT

## 2024-02-16 PROCEDURE — 84403 ASSAY OF TOTAL TESTOSTERONE: CPT

## 2024-02-16 PROCEDURE — 84443 ASSAY THYROID STIM HORMONE: CPT

## 2024-02-16 NOTE — TELEPHONE ENCOUNTER
Patient calling to request an appt with Dr. Frazier. States he missed the appt scheduled for yesterday thinking it was for today.   Appt made with provider for 2/19.  Future Appointments   Date Time Provider Department Center   2/19/2024  5:40 PM Ceasar Frazier MD [facility]   3/7/2024  7:30 AM [facility]

## 2024-02-17 LAB
BASOPHILS # BLD AUTO: 0.04 X10(3) UL (ref 0–0.2)
BASOPHILS NFR BLD AUTO: 0.8 %
DEPRECATED RDW RBC AUTO: 40 FL (ref 35.1–46.3)
EOSINOPHIL # BLD AUTO: 0.1 X10(3) UL (ref 0–0.7)
EOSINOPHIL NFR BLD AUTO: 1.9 %
ERYTHROCYTE [DISTWIDTH] IN BLOOD BY AUTOMATED COUNT: 13 % (ref 11–15)
HCT VFR BLD AUTO: 53.3 %
HGB BLD-MCNC: 18.1 G/DL
IMM GRANULOCYTES # BLD AUTO: 0.01 X10(3) UL (ref 0–1)
IMM GRANULOCYTES NFR BLD: 0.2 %
LYMPHOCYTES # BLD AUTO: 1.46 X10(3) UL (ref 1–4)
LYMPHOCYTES NFR BLD AUTO: 27.8 %
MCH RBC QN AUTO: 28.5 PG (ref 26–34)
MCHC RBC AUTO-ENTMCNC: 34 G/DL (ref 31–37)
MCV RBC AUTO: 83.9 FL
MONOCYTES # BLD AUTO: 0.51 X10(3) UL (ref 0.1–1)
MONOCYTES NFR BLD AUTO: 9.7 %
NEUTROPHILS # BLD AUTO: 3.13 X10 (3) UL (ref 1.5–7.7)
NEUTROPHILS # BLD AUTO: 3.13 X10(3) UL (ref 1.5–7.7)
NEUTROPHILS NFR BLD AUTO: 59.6 %
PLATELET # BLD AUTO: 150 10(3)UL (ref 150–450)
RBC # BLD AUTO: 6.35 X10(6)UL
WBC # BLD AUTO: 5.3 X10(3) UL (ref 4–11)

## 2024-02-19 NOTE — PROGRESS NOTES
No action needed, will discuss with patient today in clinic:  Julissa Fernandes, Your blood work is resulted as below    #CBC: your complete blood count shows Hemoglobin 18.1/53.3, meaning your Red blood cells are elevated and we need to monitor frequently as with testosterone supplementation may pre-dispose you to strokes. Given erythrocytosis and polycythemica, Causes of erythrocytosis/polycythemia may include exposure to high altitudes, chronic obstructive pulmonary disease, left-to-right cardiac shunt, renal disease, several tumors, decreased plasma volume and myeloproliferative neoplasms, especially if persistent and unexplained clinically. Might bee from testosterone supplementation or other causes which will need to discuss in clinic later today.     #Testosterone: your Testosterone levels are significantly elevated (709.57) please make sure to discuss with urologist.     #Vitamin D Deficiency:   Hello, after review of your labs here are your recommendations:    # Lipids/cholesterol: Your ASCVD, ie 10-year risk score which indicates the potential chance that you could have a heart attack, stroke or death related to cholesterol/heart disease, is LOWER than the 5% cut off, thus cholesterol medications, ie statins, are not required to be started at this time, unless you would like to do so to prevent heart disease from starting. Please proceed with the heart scan/calcium score if ordered during our visit- not required if ASCVD less than 5%. We will monitor your cholesterols yearly with your annual. Please continue with exercise and mediterranean/low carb diet.      The 10-year ASCVD risk score (Sol CANCINO, et al., 2019) is: 4.7%    Values used to calculate the score:      Age: 53 years      Sex: Male      Is Non- : No      Diabetic: No      Tobacco smoker: No      Systolic Blood Pressure: 113 mmHg      Is BP treated: No      HDL Cholesterol: 34 mg/dL      Total Cholesterol: 204 mg/dL      # CMP:  Your comprehensive metabolic panel shows overall stable functioning kidneys (creatinine, GFR), liver (AST, ALT, Bilirubin), and electrolytes (sodium, potassium, calcium). Slight variations in other values such as BUN/Creat, Serum Osm, anion gap, chloride, etc are not of clinical value at this time.     # CBC: Your complete blood count shows overall stable red blood cells, white blood cells, platelets (help you stop bleeding), and hematocrit (thickness of blood),  Slight variations in other values such as RDW/sw, MCH are not of clinical value at this time.     # TSH: Your thyroid (TSH) function is normal. TSH 3.297    # Vitamins: Your vitamin D level is     # Diabetes/A1C: Your A1C shows shows  no diabetes. We will recheck this value yearly, sooner if clinically indicated.     # Prostate: Your prostate level, ie PSA, is normal. 1.26

## 2024-03-07 ENCOUNTER — APPOINTMENT (OUTPATIENT)
Dept: GENERAL RADIOLOGY | Facility: HOSPITAL | Age: 54
End: 2024-03-07
Attending: EMERGENCY MEDICINE
Payer: COMMERCIAL

## 2024-03-07 ENCOUNTER — APPOINTMENT (OUTPATIENT)
Dept: ULTRASOUND IMAGING | Facility: HOSPITAL | Age: 54
End: 2024-03-07
Attending: EMERGENCY MEDICINE
Payer: COMMERCIAL

## 2024-03-07 ENCOUNTER — HOSPITAL ENCOUNTER (EMERGENCY)
Facility: HOSPITAL | Age: 54
Discharge: HOME | End: 2024-03-07
Attending: EMERGENCY MEDICINE
Payer: COMMERCIAL

## 2024-03-07 ENCOUNTER — NURSE TRIAGE (OUTPATIENT)
Dept: INTERNAL MEDICINE CLINIC | Facility: CLINIC | Age: 54
End: 2024-03-07

## 2024-03-07 VITALS
OXYGEN SATURATION: 95 % | WEIGHT: 160 LBS | HEART RATE: 64 BPM | BODY MASS INDEX: 27.31 KG/M2 | HEIGHT: 64 IN | DIASTOLIC BLOOD PRESSURE: 75 MMHG | RESPIRATION RATE: 18 BRPM | TEMPERATURE: 98 F | SYSTOLIC BLOOD PRESSURE: 149 MMHG

## 2024-03-07 DIAGNOSIS — R07.9 CHEST PAIN OF UNCERTAIN ETIOLOGY: Primary | ICD-10-CM

## 2024-03-07 LAB
ALBUMIN SERPL-MCNC: 4.2 G/DL (ref 3.2–4.8)
ALBUMIN/GLOB SERPL: 1.4 {RATIO} (ref 1–2)
ALP LIVER SERPL-CCNC: 95 U/L
ALT SERPL-CCNC: 26 U/L
ANION GAP SERPL CALC-SCNC: 7 MMOL/L (ref 0–18)
AST SERPL-CCNC: 22 U/L (ref ?–34)
ATRIAL RATE: 61 BPM
BASOPHILS # BLD AUTO: 0.02 X10(3) UL (ref 0–0.2)
BASOPHILS NFR BLD AUTO: 0.5 %
BILIRUB SERPL-MCNC: 1.7 MG/DL (ref 0.3–1.2)
BUN BLD-MCNC: 11 MG/DL (ref 9–23)
BUN/CREAT SERPL: 8.9 (ref 10–20)
CALCIUM BLD-MCNC: 9 MG/DL (ref 8.7–10.4)
CHLORIDE SERPL-SCNC: 110 MMOL/L (ref 98–112)
CO2 SERPL-SCNC: 27 MMOL/L (ref 21–32)
CREAT BLD-MCNC: 1.23 MG/DL
D DIMER PPP FEU-MCNC: <0.27 UG/ML FEU (ref ?–0.51)
DEPRECATED RDW RBC AUTO: 38.5 FL (ref 35.1–46.3)
EGFRCR SERPLBLD CKD-EPI 2021: 71 ML/MIN/1.73M2 (ref 60–?)
EOSINOPHIL # BLD AUTO: 0.12 X10(3) UL (ref 0–0.7)
EOSINOPHIL NFR BLD AUTO: 2.9 %
ERYTHROCYTE [DISTWIDTH] IN BLOOD BY AUTOMATED COUNT: 12.9 % (ref 11–15)
GLOBULIN PLAS-MCNC: 3 G/DL (ref 2.8–4.4)
GLUCOSE BLD-MCNC: 96 MG/DL (ref 70–99)
HCT VFR BLD AUTO: 48.1 %
HGB BLD-MCNC: 17.1 G/DL
IMM GRANULOCYTES # BLD AUTO: 0.01 X10(3) UL (ref 0–1)
IMM GRANULOCYTES NFR BLD: 0.2 %
LIPASE SERPL-CCNC: 36 U/L (ref 13–75)
LYMPHOCYTES # BLD AUTO: 1.14 X10(3) UL (ref 1–4)
LYMPHOCYTES NFR BLD AUTO: 27.6 %
MCH RBC QN AUTO: 29.4 PG (ref 26–34)
MCHC RBC AUTO-ENTMCNC: 35.6 G/DL (ref 31–37)
MCV RBC AUTO: 82.6 FL
MONOCYTES # BLD AUTO: 0.4 X10(3) UL (ref 0.1–1)
MONOCYTES NFR BLD AUTO: 9.7 %
NEUTROPHILS # BLD AUTO: 2.44 X10 (3) UL (ref 1.5–7.7)
NEUTROPHILS # BLD AUTO: 2.44 X10(3) UL (ref 1.5–7.7)
NEUTROPHILS NFR BLD AUTO: 59.1 %
OSMOLALITY SERPL CALC.SUM OF ELEC: 297 MOSM/KG (ref 275–295)
P AXIS: 55 DEGREES
P-R INTERVAL: 190 MS
PLATELET # BLD AUTO: 149 10(3)UL (ref 150–450)
POTASSIUM SERPL-SCNC: 3.8 MMOL/L (ref 3.5–5.1)
PROT SERPL-MCNC: 7.2 G/DL (ref 5.7–8.2)
Q-T INTERVAL: 412 MS
QRS DURATION: 100 MS
QTC CALCULATION (BEZET): 414 MS
R AXIS: 38 DEGREES
RBC # BLD AUTO: 5.82 X10(6)UL
SODIUM SERPL-SCNC: 144 MMOL/L (ref 136–145)
T AXIS: 60 DEGREES
TROPONIN I SERPL HS-MCNC: 3 NG/L
VENTRICULAR RATE: 61 BPM
WBC # BLD AUTO: 4.1 X10(3) UL (ref 4–11)

## 2024-03-07 PROCEDURE — 85025 COMPLETE CBC W/AUTO DIFF WBC: CPT | Performed by: EMERGENCY MEDICINE

## 2024-03-07 PROCEDURE — 99285 EMERGENCY DEPT VISIT HI MDM: CPT

## 2024-03-07 PROCEDURE — 96374 THER/PROPH/DIAG INJ IV PUSH: CPT

## 2024-03-07 PROCEDURE — 84484 ASSAY OF TROPONIN QUANT: CPT | Performed by: EMERGENCY MEDICINE

## 2024-03-07 PROCEDURE — 93005 ELECTROCARDIOGRAM TRACING: CPT

## 2024-03-07 PROCEDURE — 93010 ELECTROCARDIOGRAM REPORT: CPT

## 2024-03-07 PROCEDURE — 83690 ASSAY OF LIPASE: CPT | Performed by: EMERGENCY MEDICINE

## 2024-03-07 PROCEDURE — 71045 X-RAY EXAM CHEST 1 VIEW: CPT | Performed by: EMERGENCY MEDICINE

## 2024-03-07 PROCEDURE — 80053 COMPREHEN METABOLIC PANEL: CPT | Performed by: EMERGENCY MEDICINE

## 2024-03-07 PROCEDURE — 76705 ECHO EXAM OF ABDOMEN: CPT | Performed by: EMERGENCY MEDICINE

## 2024-03-07 PROCEDURE — 85379 FIBRIN DEGRADATION QUANT: CPT | Performed by: EMERGENCY MEDICINE

## 2024-03-07 RX ORDER — FAMOTIDINE 20 MG/1
20 TABLET, FILM COATED ORAL 2 TIMES DAILY PRN
Qty: 30 TABLET | Refills: 0 | Status: SHIPPED | OUTPATIENT
Start: 2024-03-07 | End: 2024-04-06

## 2024-03-07 RX ORDER — KETOROLAC TROMETHAMINE 15 MG/ML
15 INJECTION, SOLUTION INTRAMUSCULAR; INTRAVENOUS ONCE
Status: COMPLETED | OUTPATIENT
Start: 2024-03-07 | End: 2024-03-07

## 2024-03-07 RX ORDER — KETOROLAC TROMETHAMINE 10 MG/1
10 TABLET, FILM COATED ORAL EVERY 6 HOURS PRN
Qty: 15 TABLET | Refills: 0 | Status: SHIPPED | OUTPATIENT
Start: 2024-03-07 | End: 2024-03-14

## 2024-03-07 NOTE — TELEPHONE ENCOUNTER
Action Requested: Summary for Provider     []  Critical Lab, Recommendations Needed  [] Need Additional Advice  []   FYI    []   Need Orders  [] Need Medications Sent to Pharmacy  []  Other     SUMMARY:   [place], [place] [id number], [place]ag[place]  Patient states on 3/02/2024 he felt pain in his heart and felt his left arm going numb. Lasted 1 hour.  Yesterday and today reports feeling \"a little pain\" in his left chest.  States does not last more than 5 minutes.  Has not been able to get EKG scheduled yet that he states Dr Frazier told him to get done.  Given patient reporting active chest pain today, advised him to report to the Emergency Department to evaluate.  He states he is in [address] now, but will go later today.    Reason for call: Acute Left chest pain  Onset: 3/02/2024    Spoke with patient,  verified.   Last office visit 2024, Dr Frazier.  Discussed history of post prandial epigastric pain.    Reason for Disposition   Pain also in shoulder(s) or arm(s) or jaw    Protocols used: Chest Pain-A-[place]

## 2024-03-07 NOTE — ED PROVIDER NOTES
Patient Seen in: [facility] Emergency Department    History     Chief Complaint   Patient presents with    Chest Pain Angina     Stated Complaint: Chest Pain, Left Arm Pain    HPI    53 year old male presenting with chest pain. Pain began on Saturday he states he had the pain woke up Sunday it was gone came back on Tuesday and again this morning around 1030 or 11. The patient describes the pain as sharp feels like something in his left side this morning noticed it when he was driving, with radiation to left arm*. Patient rates pain as a 6/10 in intensity.  Associated symptoms are denies shortness of breath no fever no chills no cough.  Aggravating factors are movement.  Alleviating factors are: Nothing.   Patient's cardiac risk factors are male, age.    Patient's risk factors for DVT/PE: No.      Past Medical History:   Diagnosis Date    Diverticulitis     Gastritis        Past Surgical History:   Procedure Laterality Date    COLONOSCOPY N/A 10/07/2019    Procedure: COLONOSCOPY;  Surgeon: Linus Chinchilla MD;  Location: [facility] ENDOSCOPY    HERNIA SURGERY  2017            Family History   Problem Relation Age of Onset    Hypertension Mother     Cancer Mother         Liver       Social History     Socioeconomic History    Marital status:    Tobacco Use    Smoking status: Never     Passive exposure: Never    Smokeless tobacco: Never   Vaping Use    Vaping Use: Never used   Substance and Sexual Activity    Alcohol use: Yes     Comment: occ    Drug use: Never   Other Topics Concern    Caffeine Concern No    Exercise Yes   Social History Narrative    The patient does not use an assistive device..      The patient does live in a home with stairs.       Review of Systems    Positive for stated complaint: Chest Pain, Left Arm Pain  Other systems are as noted in HPI.  Constitutional and vital signs reviewed.      All other systems reviewed and negative except as noted above.    PSFH elements reviewed from  today and agreed except as otherwise stated in HPI.    Physical Exam     ED Triage Vitals [03/07/24 1410]   /88   Pulse 68   Resp 18   Temp 98 °F (36.7 °C)   Temp src Temporal   SpO2 97 %   O2 Device None (Room air)       Current:/75   Pulse 64   Temp 98 °F (36.7 °C) (Temporal)   Resp 18   Ht 162.6 cm (5' 4\")   Wt 72.6 kg   SpO2 95%   BMI 27.46 kg/m²   PULSE OX nl        Physical Exam    Constitutional: awake alert no sig distress  HENT: mmm, no lesions,  Neck: normal range of motion, no tenderness, supple.  Eyes: PERRL, EOMI, conjunctiva normal, no discharge. Sclera anicteric.  Cardiovascular: rr  Respiratory: Normal breath sounds, no respiratory distress, no wheezing, no chest tenderness.  GI: Bowel sounds normal, Soft, no tenderness, no masses, no pulsatile masses.  : No CVA tenderness.  Skin: Warm, dry, no erythema, no rash.  Musculoskeletal: Intact distal pulses, no edema, no tenderness, no cyanosis, no clubbing. Good range of motion in all major joints. No tenderness to palpation or major deformities noted. Back- No tenderness.  Neurologic: Alert & oriented x 3, normal motor function, normal sensory function, no focal deficits noted.  Psych: Calm, cooperative, nl affect    Differential diagnosis to include Acute coronary syndrome vs. Acute pulmonary process including pneumothorax vs. Dissection vs.  pleurisy vs. PE vs. Pneumonia/effusion vs. GI related pathology such as GERD or gastritis, vs. Musculoskeletal        ED Course     Labs Reviewed   COMP METABOLIC PANEL (14) - Abnormal; Notable for the following components:       Result Value    BUN/CREA Ratio 8.9 (*)     Calculated Osmolality 297 (*)     Bilirubin, Total 1.7 (*)     All other components within normal limits   CBC W/ DIFFERENTIAL - Abnormal; Notable for the following components:    RBC 5.82 (*)     .0 (*)     All other components within normal limits   TROPONIN I HIGH SENSITIVITY - Normal   LIPASE - Normal   D-DIMER -  Normal   CBC WITH DIFFERENTIAL WITH PLATELET    Narrative:     The following orders were created for panel order CBC With Differential With Platelet.  Procedure                               Abnormality         Status                     ---------                               -----------         ------                     CBC W/ DIFFERENTIAL[[id number]]          Abnormal            Final result                 Please view results for these tests on the individual orders.   RAINBOW DRAW BLUE     EKG    Rate, intervals and axes as noted on EKG Report.  Rate: 66  Rhythm: Sinus Rhythm  Reading: Normal intervals, normal EKG             MDM     Monitor Interpretation:  Nsr 66    Radiology Interpretation:    US GALLBLADDER (CPT=76705)    Result Date: 3/7/2024  CONCLUSION: Normal gallbladder    Dictated by (CST): Clayton Elkins MD on 3/07/2024 at 4:13 PM     Finalized by (CST): Clayton Elkins MD on 3/07/2024 at 4:14 PM          XR CHEST AP PORTABLE  (CPT=71045)    Result Date: 3/7/2024  CONCLUSION: No acute cardiopulmonary disease.    Dictated by (CST): Glenn Clay MD on 3/07/2024 at 3:28 PM     Finalized by (CST): Glenn Clay MD on 3/07/2024 at 3:28 PM           I reviewed xray noted no infiltrates no pneumothorax        HEART score for chest pain  History- (Highly suspicious 2pt, Mod 1pt, slightly 0pt)        1  ECG- (significant ST deviation 2pt, Non spec 1pt, nl 0pt)  0  AGE- (>65 2pt, 45-64 1 pt, Under 45 0 pt)    1  Risk Factors- ( DM,HTN,Chol, fhx CAD, BMI>30, hx CAD)  ( >3 or hx CAD 2pt, 1-2 risks 1pt, None 0pt)  0  Troponin- ( 3 times nl 2pt, 2 times nl 1pt, nl 0pt   0         TOTAL  2    SCORE 0-3: 2.5% MACE over next 6 weeks consider D/C outpatient F/U  SCORE 4-6: 20.3% MACE over next 6 weeks consider admit  SCORE 7-10:72.7% MACE over next 6 weeks consider early invasive strategy.    Patient has a HEART score of 2, plan will be outpt stress test.    Six AJ, Kareen BE, Gene BOOGIE. Chest pain in the  emergency room: value of the HEART score. [facility] Heart J. 2008 Michael;16(6):191-6. PubMed PMID: [id number]; PubMed Central PMCID: [id number].  Aortic Dissection Risk Assesment:    High risk Conditions (Marfan, Fhx,Known aortic valve disease, known dissection or recent aortic manipulation) no  High Risk Pain Features (Severe. Abrupt Ripping or tearing) no  High Risk Exam Features (pulse deficit, BP difference, focal neuro deficit,murmur of aortic insufficiency, hypotension or shock) no     Medical Decision Making  Problems Addressed:  Chest pain of uncertain etiology: acute illness or injury     Details: Work up nl trop nl ekd, slight bump bilirubin, us normal    Amount and/or Complexity of Data Reviewed  Labs: ordered. Decision-making details documented in ED Course.  Radiology: ordered and independent interpretation performed. Decision-making details documented in ED Course.  ECG/medicine tests: ordered and independent interpretation performed. Decision-making details documented in ED Course.  Discussion of management or test interpretation with external provider(s): Maalox/tylenol bland diet    Risk  OTC drugs.  Prescription drug management.            Disposition and Plan     Clinical Impression:  1. Chest pain of uncertain etiology        Disposition:  Discharge    Follow-up:  Muriel Morgan MD  [address] [place] [address]  [phone]    Follow up        Medications Prescribed:  Discharge Medication List as of 3/7/2024  5:48 PM        START taking these medications    Details   famotidine (PEPCID) 20 MG Oral Tab Take 1 tablet (20 mg total) by mouth 2 (two) times daily as needed for Heartburn., Normal, Disp-30 tablet, R-0      Ketorolac Tromethamine 10 MG Oral Tab Take 1 tablet (10 mg total) by mouth every 6 (six) hours as needed for Pain., Normal, Disp-15 tablet, R-0

## 2024-03-07 NOTE — ED INITIAL ASSESSMENT (HPI)
C/o left sided chest pain that radiates down left arm/hand, reports symptoms started on Saturday. Denies any cardiac hx.

## 2024-03-12 ENCOUNTER — PATIENT OUTREACH (OUTPATIENT)
Dept: CASE MANAGEMENT | Age: 54
End: 2024-03-12

## 2024-03-12 NOTE — PROGRESS NOTES
1st attempt ER f/up apt request    Ceasar Frazier  PCP  [address] [facility]  [address]  [facility] [address]  [phone]  Apt: March 14 @11:14am     Confirmed w/ pt  Closing encounter

## 2024-06-20 ENCOUNTER — HOSPITAL ENCOUNTER (OUTPATIENT)
Dept: GENERAL RADIOLOGY | Age: 54
Discharge: HOME | End: 2024-06-20
Attending: FAMILY MEDICINE

## 2024-06-20 DIAGNOSIS — M79.609 PAIN IN LIMB: ICD-10-CM

## 2024-06-20 PROCEDURE — 73560 X-RAY EXAM OF KNEE 1 OR 2: CPT | Performed by: FAMILY MEDICINE
